# Patient Record
Sex: FEMALE | Race: AMERICAN INDIAN OR ALASKA NATIVE | Employment: FULL TIME | URBAN - METROPOLITAN AREA
[De-identification: names, ages, dates, MRNs, and addresses within clinical notes are randomized per-mention and may not be internally consistent; named-entity substitution may affect disease eponyms.]

---

## 2017-08-30 ENCOUNTER — HOSPITAL ENCOUNTER (OUTPATIENT)
Dept: MAMMOGRAPHY | Age: 47
Discharge: HOME | End: 2017-08-30
Attending: FAMILY MEDICINE
Payer: COMMERCIAL

## 2017-08-30 DIAGNOSIS — Z12.31 VISIT FOR SCREENING MAMMOGRAM: ICD-10-CM

## 2017-08-30 PROCEDURE — 77067 SCR MAMMO BI INCL CAD: CPT

## 2018-09-11 ENCOUNTER — HOSPITAL ENCOUNTER (OUTPATIENT)
Dept: MAMMOGRAPHY | Age: 48
Discharge: HOME | End: 2018-09-11
Attending: FAMILY MEDICINE
Payer: COMMERCIAL

## 2018-09-11 DIAGNOSIS — Z12.31 VISIT FOR SCREENING MAMMOGRAM: ICD-10-CM

## 2018-09-11 PROCEDURE — 77067 SCR MAMMO BI INCL CAD: CPT

## 2019-03-29 ENCOUNTER — HOSPITAL ENCOUNTER (INPATIENT)
Age: 49
LOS: 2 days | Discharge: HOME | DRG: 638 | End: 2019-03-31
Attending: EMERGENCY MEDICINE | Admitting: INTERNAL MEDICINE
Payer: COMMERCIAL

## 2019-03-29 DIAGNOSIS — E13.10 DIABETIC KETOACIDOSIS WITHOUT COMA ASSOCIATED WITH OTHER SPECIFIED DIABETES MELLITUS (HCC): Primary | ICD-10-CM

## 2019-03-29 PROBLEM — E11.10 DKA (DIABETIC KETOACIDOSES): Status: ACTIVE | Noted: 2019-03-29

## 2019-03-29 LAB
ADMINISTERED INITIALS, ADMINIT: NORMAL
ALBUMIN SERPL-MCNC: 4.8 G/DL (ref 3.4–5)
ALBUMIN/GLOB SERPL: 1 {RATIO} (ref 0.8–1.7)
ALP SERPL-CCNC: 156 U/L (ref 45–117)
ALT SERPL-CCNC: 32 U/L (ref 13–56)
ANION GAP SERPL CALC-SCNC: 14 MMOL/L (ref 3–18)
APPEARANCE UR: ABNORMAL
AST SERPL-CCNC: 9 U/L (ref 15–37)
BASOPHILS # BLD: 0 K/UL (ref 0–0.1)
BASOPHILS NFR BLD: 0 % (ref 0–2)
BILIRUB SERPL-MCNC: 0.4 MG/DL (ref 0.2–1)
BILIRUB UR QL: NEGATIVE
BUN SERPL-MCNC: 59 MG/DL (ref 7–18)
BUN/CREAT SERPL: 29 (ref 12–20)
CALCIUM SERPL-MCNC: 10.1 MG/DL (ref 8.5–10.1)
CHLORIDE SERPL-SCNC: 95 MMOL/L (ref 100–108)
CO2 SERPL-SCNC: 24 MMOL/L (ref 21–32)
COLOR UR: YELLOW
CREAT SERPL-MCNC: 2.03 MG/DL (ref 0.6–1.3)
D50 ADMINISTERED, D50ADM: 0 ML
D50 ORDER, D50ORD: 0 ML
DIFFERENTIAL METHOD BLD: ABNORMAL
EOSINOPHIL # BLD: 0 K/UL (ref 0–0.4)
EOSINOPHIL NFR BLD: 0 % (ref 0–5)
ERYTHROCYTE [DISTWIDTH] IN BLOOD BY AUTOMATED COUNT: 12.3 % (ref 11.6–14.5)
EST. AVERAGE GLUCOSE BLD GHB EST-MCNC: 260 MG/DL
GLOBULIN SER CALC-MCNC: 4.8 G/DL (ref 2–4)
GLUCOSE BLD STRIP.AUTO-MCNC: 162 MG/DL (ref 70–110)
GLUCOSE BLD STRIP.AUTO-MCNC: 175 MG/DL (ref 70–110)
GLUCOSE BLD STRIP.AUTO-MCNC: 217 MG/DL (ref 70–110)
GLUCOSE BLD STRIP.AUTO-MCNC: 355 MG/DL (ref 70–110)
GLUCOSE BLD STRIP.AUTO-MCNC: 485 MG/DL (ref 70–110)
GLUCOSE BLD STRIP.AUTO-MCNC: 572 MG/DL (ref 70–110)
GLUCOSE BLD STRIP.AUTO-MCNC: >600 MG/DL (ref 70–110)
GLUCOSE BLD STRIP.AUTO-MCNC: >600 MG/DL (ref 70–110)
GLUCOSE SERPL-MCNC: 980 MG/DL (ref 74–99)
GLUCOSE UR STRIP.AUTO-MCNC: >1000 MG/DL
GLUCOSE, GLC: 162 MG/DL
GLUCOSE, GLC: 175 MG/DL
GLUCOSE, GLC: 217 MG/DL
GLUCOSE, GLC: 355 MG/DL
GLUCOSE, GLC: 485 MG/DL
GLUCOSE, GLC: 572 MG/DL
GLUCOSE, GLC: 600 MG/DL
GLUCOSE, GLC: 601 MG/DL
HBA1C MFR BLD: 10.7 % (ref 4.2–5.6)
HCT VFR BLD AUTO: 47.8 % (ref 35–45)
HGB BLD-MCNC: 16.1 G/DL (ref 12–16)
HGB UR QL STRIP: NEGATIVE
HIGH TARGET, HITG: 180 MG/DL
INSULIN ADMINSTERED, INSADM: 10.8 UNITS/HOUR
INSULIN ADMINSTERED, INSADM: 14.8 UNITS/HOUR
INSULIN ADMINSTERED, INSADM: 16.2 UNITS/HOUR
INSULIN ADMINSTERED, INSADM: 20.5 UNITS/HOUR
INSULIN ADMINSTERED, INSADM: 21.3 UNITS/HOUR
INSULIN ADMINSTERED, INSADM: 5.1 UNITS/HOUR
INSULIN ADMINSTERED, INSADM: 5.8 UNITS/HOUR
INSULIN ADMINSTERED, INSADM: 7.9 UNITS/HOUR
INSULIN ORDER, INSORD: 10.8 UNITS/HOUR
INSULIN ORDER, INSORD: 14.8 UNITS/HOUR
INSULIN ORDER, INSORD: 16.2 UNITS/HOUR
INSULIN ORDER, INSORD: 20.5 UNITS/HOUR
INSULIN ORDER, INSORD: 21.3 UNITS/HOUR
INSULIN ORDER, INSORD: 5.1 UNITS/HOUR
INSULIN ORDER, INSORD: 5.8 UNITS/HOUR
INSULIN ORDER, INSORD: 7.9 UNITS/HOUR
KETONES UR QL STRIP.AUTO: 15 MG/DL
LEUKOCYTE ESTERASE UR QL STRIP.AUTO: NEGATIVE
LOW TARGET, LOT: 140 MG/DL
LYMPHOCYTES # BLD: 1.2 K/UL (ref 0.9–3.6)
LYMPHOCYTES NFR BLD: 8 % (ref 21–52)
MAGNESIUM SERPL-MCNC: 3.8 MG/DL (ref 1.6–2.6)
MAGNESIUM SERPL-MCNC: 4.2 MG/DL (ref 1.6–2.6)
MCH RBC QN AUTO: 27.7 PG (ref 24–34)
MCHC RBC AUTO-ENTMCNC: 33.7 G/DL (ref 31–37)
MCV RBC AUTO: 82.3 FL (ref 74–97)
MINUTES UNTIL NEXT BG, NBG: 60 MIN
MONOCYTES # BLD: 0.8 K/UL (ref 0.05–1.2)
MONOCYTES NFR BLD: 5 % (ref 3–10)
MULTIPLIER, MUL: 0.02
MULTIPLIER, MUL: 0.03
MULTIPLIER, MUL: 0.04
MULTIPLIER, MUL: 0.05
NEUTS SEG # BLD: 12.9 K/UL (ref 1.8–8)
NEUTS SEG NFR BLD: 87 % (ref 40–73)
NITRITE UR QL STRIP.AUTO: NEGATIVE
ORDER INITIALS, ORDINIT: NORMAL
PH UR STRIP: 5 [PH] (ref 5–8)
PHOSPHATE SERPL-MCNC: 8.7 MG/DL (ref 2.5–4.9)
PLATELET # BLD AUTO: 256 K/UL (ref 135–420)
PMV BLD AUTO: 12 FL (ref 9.2–11.8)
POTASSIUM SERPL-SCNC: 4.9 MMOL/L (ref 3.5–5.5)
PROT SERPL-MCNC: 9.6 G/DL (ref 6.4–8.2)
PROT UR STRIP-MCNC: NEGATIVE MG/DL
RBC # BLD AUTO: 5.81 M/UL (ref 4.2–5.3)
SODIUM SERPL-SCNC: 133 MMOL/L (ref 136–145)
SP GR UR REFRACTOMETRY: 1.03 (ref 1–1.03)
UROBILINOGEN UR QL STRIP.AUTO: 0.2 EU/DL (ref 0.2–1)
WBC # BLD AUTO: 15 K/UL (ref 4.6–13.2)

## 2019-03-29 PROCEDURE — 80053 COMPREHEN METABOLIC PANEL: CPT

## 2019-03-29 PROCEDURE — 85025 COMPLETE CBC W/AUTO DIFF WBC: CPT

## 2019-03-29 PROCEDURE — 74011250636 HC RX REV CODE- 250/636: Performed by: EMERGENCY MEDICINE

## 2019-03-29 PROCEDURE — 74011636637 HC RX REV CODE- 636/637: Performed by: EMERGENCY MEDICINE

## 2019-03-29 PROCEDURE — 65660000004 HC RM CVT STEPDOWN

## 2019-03-29 PROCEDURE — 36415 COLL VENOUS BLD VENIPUNCTURE: CPT

## 2019-03-29 PROCEDURE — 83036 HEMOGLOBIN GLYCOSYLATED A1C: CPT

## 2019-03-29 PROCEDURE — 99283 EMERGENCY DEPT VISIT LOW MDM: CPT

## 2019-03-29 PROCEDURE — 74011250637 HC RX REV CODE- 250/637: Performed by: INTERNAL MEDICINE

## 2019-03-29 PROCEDURE — 74011250636 HC RX REV CODE- 250/636: Performed by: INTERNAL MEDICINE

## 2019-03-29 PROCEDURE — 84100 ASSAY OF PHOSPHORUS: CPT

## 2019-03-29 PROCEDURE — 93005 ELECTROCARDIOGRAM TRACING: CPT

## 2019-03-29 PROCEDURE — 96360 HYDRATION IV INFUSION INIT: CPT

## 2019-03-29 PROCEDURE — 82962 GLUCOSE BLOOD TEST: CPT

## 2019-03-29 PROCEDURE — 81003 URINALYSIS AUTO W/O SCOPE: CPT

## 2019-03-29 PROCEDURE — 83735 ASSAY OF MAGNESIUM: CPT

## 2019-03-29 RX ORDER — SODIUM CHLORIDE 9 MG/ML
125 INJECTION, SOLUTION INTRAVENOUS CONTINUOUS
Status: DISCONTINUED | OUTPATIENT
Start: 2019-03-29 | End: 2019-03-30

## 2019-03-29 RX ORDER — DEXTROSE 50 % IN WATER (D50W) INTRAVENOUS SYRINGE
25-50 AS NEEDED
Status: DISCONTINUED | OUTPATIENT
Start: 2019-03-29 | End: 2019-03-30

## 2019-03-29 RX ORDER — SERTRALINE HYDROCHLORIDE 100 MG/1
100 TABLET, FILM COATED ORAL DAILY
COMMUNITY
End: 2019-05-02

## 2019-03-29 RX ORDER — LISINOPRIL AND HYDROCHLOROTHIAZIDE 10; 12.5 MG/1; MG/1
1 TABLET ORAL DAILY
COMMUNITY
End: 2019-05-02 | Stop reason: SDUPTHER

## 2019-03-29 RX ORDER — SERTRALINE HYDROCHLORIDE 50 MG/1
100 TABLET, FILM COATED ORAL DAILY
Status: DISCONTINUED | OUTPATIENT
Start: 2019-03-30 | End: 2019-03-31 | Stop reason: HOSPADM

## 2019-03-29 RX ORDER — SIMVASTATIN 40 MG/1
40 TABLET, FILM COATED ORAL
COMMUNITY
End: 2019-05-02

## 2019-03-29 RX ORDER — SIMVASTATIN 40 MG/1
40 TABLET, FILM COATED ORAL
Status: DISCONTINUED | OUTPATIENT
Start: 2019-03-29 | End: 2019-03-31 | Stop reason: HOSPADM

## 2019-03-29 RX ORDER — MAGNESIUM SULFATE 100 %
4 CRYSTALS MISCELLANEOUS AS NEEDED
Status: DISCONTINUED | OUTPATIENT
Start: 2019-03-29 | End: 2019-03-30

## 2019-03-29 RX ADMIN — SODIUM CHLORIDE 125 ML/HR: 900 INJECTION, SOLUTION INTRAVENOUS at 21:59

## 2019-03-29 RX ADMIN — SODIUM CHLORIDE 1000 ML: 900 INJECTION, SOLUTION INTRAVENOUS at 13:11

## 2019-03-29 RX ADMIN — SODIUM CHLORIDE 1000 ML: 900 INJECTION, SOLUTION INTRAVENOUS at 12:11

## 2019-03-29 RX ADMIN — Medication 10.8 UNITS/HR: at 13:46

## 2019-03-29 RX ADMIN — SODIUM CHLORIDE 1000 ML: 900 INJECTION, SOLUTION INTRAVENOUS at 12:07

## 2019-03-29 RX ADMIN — SIMVASTATIN 40 MG: 40 TABLET, FILM COATED ORAL at 21:59

## 2019-03-29 NOTE — ROUTINE PROCESS
TRANSFER - OUT REPORT: 
 
Verbal report given to [facility] RN(name) on Markus Owen  being transferred to [facility](unit) for routine progression of care Report consisted of patients Situation, Background, Assessment and  
Recommendations(SBAR). Information from the following report(s) SBAR was reviewed with the receiving nurse. Lines:  
Peripheral IV 03/29/19 Left Antecubital (Active) Opportunity for questions and clarification was provided. Patient transported with: 
 Monitor Registered Nurse Tech

## 2019-03-29 NOTE — ED PROVIDER NOTES
EMERGENCY DEPARTMENT HISTORY AND PHYSICAL EXAM 
 
11:54 AM 
Date: 3/29/2019 Patient Name: Federico Jacobs History of Presenting Illness Chief Complaint Patient presents with  Abnormal Lab Results  Dizziness History Provided By: Patient HPI: Federico Jacobs is a 51 y.o. female with hypertension, hyperlipidemia, here for here for concern for diabetes. So since Monday she has had a dry mouth, thirst, and increased urination. [facility] discovered a glucose over 700, borderline acidosis, urinalysis with 500 glucose with ketones, AK I. Potassium is 4.7 there. She has tried to drink but it hasn't helped her symptoms. No other concerns. PCP: Agustín Rankin MD 
No current facility-administered medications on file prior to encounter. Current Outpatient Medications on File Prior to Encounter Medication Sig Dispense Refill  B-complex with vitamin C (VITAMIN B COMPLEX-C PO) Take  by mouth.  sertraline (ZOLOFT) 100 mg tablet Take 100 mg by mouth daily.  lisinopril-hydroCHLOROthiazide (PRINZIDE, ZESTORETIC) 10-12.5 mg per tablet Take 1 Tab by mouth daily.  simvastatin (ZOCOR) 40 mg tablet Take 40 mg by mouth nightly. Past History Past Medical History: 
Past Medical History:  
Diagnosis Date  Depression  High cholesterol  Hypertension Past Surgical History: 
History reviewed. No pertinent surgical history. Family History: 
History reviewed. No pertinent family history. Social History: 
Social History Tobacco Use  Smoking status: Not on file Substance Use Topics  Alcohol use: Not on file  Drug use: Not on file Allergies: 
No Known Allergies Review of Systems Constitutional: No fevers. Eyes: No visual symptoms. ENT: No sore throat, runny nose, or ear pain. Respiratory: No cough, dyspnea, or wheezing. Cardiovascular: No chest pain, palpitations, or heaviness. Gastrointestinal: No nausea, vomiting, diarrhea. Genitourinary: No dysuria or urgency but she does have frequency. Musculoskeletal: No joint pain or swelling. Integumentary: No rashes. Neurological: No headaches, sensory or motor symptoms. All other systems negative. Physical Exam  
 
Patient Vitals for the past 12 hrs: 
 Temp Pulse Resp BP SpO2  
03/29/19 1032 98.4 °F (36.9 °C) (!) 105 18 141/80 96 % Physical Exam  
Constitutional: Appears well-developed. Is not diaphoretic. Eyes: Conjunctiva clear, EOMI Head: Normocephalic and atraumatic. Dry mucous membranes. Neck: Normal range of motion. No stridor or tracheal deviation. Cardiovascular: Intact distal pulses. Tachycardia. Pulmonary/Chest: Effort normal and no respiratory distress. Abdominal: Non distended. Musculoskeletal: Normal range of motion. Exhibits no tenderness. Neurological: Conversant, alert. Skin: Skin is warm and dry. Psychiatric: Has a normal mood and affect. Behavior is normal.  
Nursing note and vitals reviewed. Diagnostic Study Results Labs - Recent Results (from the past 12 hour(s)) EKG, 12 LEAD, INITIAL Collection Time: 03/29/19 10:46 AM  
Result Value Ref Range Ventricular Rate 108 BPM  
 Atrial Rate 108 BPM  
 P-R Interval 134 ms QRS Duration 96 ms  
 Q-T Interval 356 ms  
 QTC Calculation (Bezet) 477 ms Calculated P Axis 50 degrees Calculated R Axis -22 degrees Calculated T Axis 40 degrees Diagnosis Sinus tachycardia Biatrial enlargement Abnormal ECG No previous ECGs available CBC WITH AUTOMATED DIFF Collection Time: 03/29/19 11:44 AM  
Result Value Ref Range WBC 15.0 (H) 4.6 - 13.2 K/uL  
 RBC 5.81 (H) 4.20 - 5.30 M/uL  
 HGB 16.1 (H) 12.0 - 16.0 g/dL HCT 47.8 (H) 35.0 - 45.0 % MCV 82.3 74.0 - 97.0 FL  
 MCH 27.7 24.0 - 34.0 PG  
 MCHC 33.7 31.0 - 37.0 g/dL  
 RDW 12.3 11.6 - 14.5 % PLATELET 694 364 - 614 K/uL  MPV 12.0 (H) 9.2 - 11.8 FL  
 NEUTROPHILS 87 (H) 40 - 73 % LYMPHOCYTES 8 (L) 21 - 52 % MONOCYTES 5 3 - 10 % EOSINOPHILS 0 0 - 5 % BASOPHILS 0 0 - 2 %  
 ABS. NEUTROPHILS 12.9 (H) 1.8 - 8.0 K/UL  
 ABS. LYMPHOCYTES 1.2 0.9 - 3.6 K/UL  
 ABS. MONOCYTES 0.8 0.05 - 1.2 K/UL  
 ABS. EOSINOPHILS 0.0 0.0 - 0.4 K/UL  
 ABS. BASOPHILS 0.0 0.0 - 0.1 K/UL  
 DF AUTOMATED METABOLIC PANEL, COMPREHENSIVE Collection Time: 03/29/19 11:44 AM  
Result Value Ref Range Sodium 133 (L) 136 - 145 mmol/L Potassium 4.9 3.5 - 5.5 mmol/L Chloride 95 (L) 100 - 108 mmol/L  
 CO2 24 21 - 32 mmol/L Anion gap 14 3.0 - 18 mmol/L Glucose 980 (HH) 74 - 99 mg/dL BUN 59 (H) 7.0 - 18 MG/DL Creatinine 2.03 (H) 0.6 - 1.3 MG/DL  
 BUN/Creatinine ratio 29 (H) 12 - 20 GFR est AA 32 (L) >60 ml/min/1.73m2 GFR est non-AA 26 (L) >60 ml/min/1.73m2 Calcium 10.1 8.5 - 10.1 MG/DL Bilirubin, total 0.4 0.2 - 1.0 MG/DL  
 ALT (SGPT) 32 13 - 56 U/L  
 AST (SGOT) 9 (L) 15 - 37 U/L Alk. phosphatase 156 (H) 45 - 117 U/L Protein, total 9.6 (H) 6.4 - 8.2 g/dL Albumin 4.8 3.4 - 5.0 g/dL Globulin 4.8 (H) 2.0 - 4.0 g/dL A-G Ratio 1.0 0.8 - 1.7 MAGNESIUM Collection Time: 03/29/19 11:44 AM  
Result Value Ref Range Magnesium 4.2 (H) 1.6 - 2.6 mg/dL PHOSPHORUS Collection Time: 03/29/19 11:44 AM  
Result Value Ref Range Phosphorus 8.7 (H) 2.5 - 4.9 MG/DL  
HEMOGLOBIN A1C WITH EAG Collection Time: 03/29/19 11:44 AM  
Result Value Ref Range Hemoglobin A1c 10.7 (H) 4.2 - 5.6 % Est. average glucose 260 mg/dL URINALYSIS W/ RFLX MICROSCOPIC Collection Time: 03/29/19 12:29 PM  
Result Value Ref Range Color YELLOW Appearance CLOUDY Specific gravity 1.026 1.005 - 1.030    
 pH (UA) 5.0 5.0 - 8.0 Protein NEGATIVE  NEG mg/dL Glucose >1,000 (A) NEG mg/dL Ketone 15 (A) NEG mg/dL Bilirubin NEGATIVE  NEG Blood NEGATIVE  NEG Urobilinogen 0.2 0.2 - 1.0 EU/dL  Nitrites NEGATIVE  NEG    
 Leukocyte Esterase NEGATIVE  NEG Radiologic Studies - No orders to display CT Results  (Last 48 hours) None CXR Results  (Last 48 hours) None Medical Decision Making ED Course: Progress Notes, Reevaluation, and Consults: 
 
 
Provider Notes (Medical Decision Making): This patient came to the ED with significant hyperglycemia. Based on history, physical exam, and diagnostic evaluation, the patient appears to be in mild diabetic ketoacidosis. There is no evidence of infection precipitating these events and this is a new diagnosis. They were treated in the ED with agressive fluids and insulin in the ED with glucostabilizer. Despite treatment, the severity of the patient's condition requires further management as an inpatient. I have discussed the case with the admitting physician who accepts the patient. Critical Care Time: Critical Care Time: The services I provided to this patient were to treat and/or prevent clinically significant deterioration that could result in the failure of one or more body systems and/or organ systems due to DKA. Services included the following: 
-reviewing nursing notes and old charts 
-vital sign assessments 
-direct patient care 
-medication orders and management 
-interpreting and reviewing diagnostic studies/labs 
-re-evaluations 
-documentation time Aggregate critical care time was 35 minutes, which includes only time during which I was engaged in work directly related to the patient's care as described above, whether I was at bedside or elsewhere in the Emergency Department. It did not include time spent performing other reported procedures or the services of residents, students, nurses, or advance practice providers. Sheryle Gary, MD 
 
1:37 PM 
 
 
Current Outpatient Medications Medication Sig Dispense Refill  B-complex with vitamin C (VITAMIN B COMPLEX-C PO) Take  by mouth.  sertraline (ZOLOFT) 100 mg tablet Take 100 mg by mouth daily.  lisinopril-hydroCHLOROthiazide (PRINZIDE, ZESTORETIC) 10-12.5 mg per tablet Take 1 Tab by mouth daily.  simvastatin (ZOCOR) 40 mg tablet Take 40 mg by mouth nightly. Vital Signs-Reviewed the patient's vital signs. EKG: Interpreted by the EP. Time Interpreted: 7712 Rate: 108 Rhythm: Sinus tachycardia Interpretation: , nonspecific ST's. Records Reviewed: Nursing Notes and Old Medical Records (Time of Review: 11:54 AM) 
-I am the first provider for this patient. 
-I reviewed the vital signs, available nursing notes, past medical history, past surgical history, family history and social history. Diagnosis Clinical Impression: 1. Diabetic ketoacidosis without coma associated with other specified diabetes mellitus ([facility] [address] Disposition: Admit to stepdown

## 2019-03-29 NOTE — ED TRIAGE NOTES
\"she went to [facility] and we found out that she has diabetes so they told us to come here\". Pt. Also c/o lightheadedness x 3 days.

## 2019-03-29 NOTE — DIABETES MGMT
GLYCEMIC CONTROL AND NUTRITION Assessment/Recommendations: 
Visited pt in the emergency room.  at bedside. New diagnosis of diabetes. Admitted with BG of 980 mg/dl. GlucoStabilizer insulin gtt started. Will continue inpatient monitoring and education. Contact information given to pt for future questions/concerns. Most recent blood glucose values: 
Results for Rodrick Bills (MRN [id number]) as of 3/29/2019 14:18 Ref. Range 3/29/2019 11:44 Glucose Latest Ref Range: 74 - 99 mg/dL 980 () Current A1C of 10.7 % is equivalent to average blood glucose of 260 mg/dl over the past 2-3 months. Current hospital diabetes medications:  
GlucoStabilizer insulin gtt Home diabetes medications: 
None. New diagnosis Diet:   
Currently NPO Education:  __x_Refer to Diabetes Education Record  
          ____Education not indicated at this time Inga Pearson RN CDE Ext [phone]

## 2019-03-29 NOTE — H&P
History and Physical 
NAME:  Altaf Driver :   1970 MRN:   [id number] Date/Time:  3/29/2019 Chief Complaint: elevated blood sugar History of Present Illness: Ms. Irma Darnell is a 51 y.o.   female with a PMH of HTN, hyperlipidemia and depression who presents with c/c of elevated blood sugar. She was having polydipsia, polyuria since Monday. She went to [facility] today and found to have elevated blood sugar >700 and send to ED. Here in ED her blood sugar was 980. CO2 is 24. She looks dry. She is started on IV insulin drip and IVF per protocol. She has no fever or chills. No cough or shortness of breathing. No urinary complaint. Past Medical History:  
Diagnosis Date  Depression  High cholesterol  Hypertension History reviewed. No pertinent surgical history. Social History Tobacco Use  Smoking status: Not on file Substance Use Topics  Alcohol use: Not on file History reviewed. No pertinent family history. No Known Allergies Prior to Admission medications Medication Sig Start Date End Date Taking? Authorizing Provider B-complex with vitamin C (VITAMIN B COMPLEX-C PO) Take  by mouth. Yes Other, MD Martine  
sertraline (ZOLOFT) 100 mg tablet Take 100 mg by mouth daily. Yes Other, MD Martine  
lisinopril-hydroCHLOROthiazide (PRINZIDE, ZESTORETIC) 10-12.5 mg per tablet Take 1 Tab by mouth daily. Yes Other, MD Martine  
simvastatin (ZOCOR) 40 mg tablet Take 40 mg by mouth nightly. Yes Other, MD Martine  
 
 
  
Review of systems:  
 
CONSTITUTIONAL: No Fever, No chills, No weight loss, No Night sweats HEENT:  No epistaxis, No diff in swallowing CVS: No chest pain, No palpitations, No syncope, No peripheral edema, No PND, No orthopnea RS: No shortness of breath, No cough, No hemoptysis, No pleuritic chest pain GI: No abd pain, No vomitting, No diarrhea, No hematemesis, No rectal bleeding, No acid reflux or heartburn NEURO: No focal weakness, No headaches, No seizures PSYCH: No anxiety, No depression MUSCULOSKLETAL: No joint pain or swelling : No hematuria or dysuria SKIN: No rash Physical Exam: VITALS:   
Vital signs reviewed; most recent are: 
 
Visit Vitals /80 (BP 1 Location: Left arm, BP Patient Position: Sitting) Pulse (!) 105 Temp 98.4 °F (36.9 °C) Resp 18 Ht 5' 4\" (1.626 m) Wt 95.7 kg (211 lb 1 oz) SpO2 96% BMI 36.23 kg/m² SpO2 Readings from Last 6 Encounters:  
03/29/19 96% No intake or output data in the 24 hours ending 03/29/19 1416 GENERAL: Not in acute distress HEENT: pink conjunctiva, un icteric sclera, NECK: No lymphadenopthy or thyroid swelling, JVD not seen LYMPH: No supraclavicular or cervical or axillary nodes on both sides CVS: S1S2, No murmurs, No gallop or rub RS: CTA, No wheezing or crackles Abd: Soft, non tender, not distended, No guarding, No rigidity NEURO:  No focal neurologic deficits Extrm: no leg edema or swelling Skin: No rash Labs:  
 
Recent Results (from the past 24 hour(s)) EKG, 12 LEAD, INITIAL Collection Time: 03/29/19 10:46 AM  
Result Value Ref Range Ventricular Rate 108 BPM  
 Atrial Rate 108 BPM  
 P-R Interval 134 ms QRS Duration 96 ms  
 Q-T Interval 356 ms  
 QTC Calculation (Bezet) 477 ms Calculated P Axis 50 degrees Calculated R Axis -22 degrees Calculated T Axis 40 degrees Diagnosis Sinus tachycardia Biatrial enlargement Abnormal ECG No previous ECGs available CBC WITH AUTOMATED DIFF Collection Time: 03/29/19 11:44 AM  
Result Value Ref Range WBC 15.0 (H) 4.6 - 13.2 K/uL  
 RBC 5.81 (H) 4.20 - 5.30 M/uL  
 HGB 16.1 (H) 12.0 - 16.0 g/dL HCT 47.8 (H) 35.0 - 45.0 % MCV 82.3 74.0 - 97.0 FL  
 MCH 27.7 24.0 - 34.0 PG  
 MCHC 33.7 31.0 - 37.0 g/dL  
 RDW 12.3 11.6 - 14.5 % PLATELET 819 652 - 039 K/uL  MPV 12.0 (H) 9.2 - 11.8 FL  
 NEUTROPHILS 87 (H) 40 - 73 % LYMPHOCYTES 8 (L) 21 - 52 % MONOCYTES 5 3 - 10 % EOSINOPHILS 0 0 - 5 % BASOPHILS 0 0 - 2 %  
 ABS. NEUTROPHILS 12.9 (H) 1.8 - 8.0 K/UL  
 ABS. LYMPHOCYTES 1.2 0.9 - 3.6 K/UL  
 ABS. MONOCYTES 0.8 0.05 - 1.2 K/UL  
 ABS. EOSINOPHILS 0.0 0.0 - 0.4 K/UL  
 ABS. BASOPHILS 0.0 0.0 - 0.1 K/UL  
 DF AUTOMATED METABOLIC PANEL, COMPREHENSIVE Collection Time: 03/29/19 11:44 AM  
Result Value Ref Range Sodium 133 (L) 136 - 145 mmol/L Potassium 4.9 3.5 - 5.5 mmol/L Chloride 95 (L) 100 - 108 mmol/L  
 CO2 24 21 - 32 mmol/L Anion gap 14 3.0 - 18 mmol/L Glucose 980 (HH) 74 - 99 mg/dL BUN 59 (H) 7.0 - 18 MG/DL Creatinine 2.03 (H) 0.6 - 1.3 MG/DL  
 BUN/Creatinine ratio 29 (H) 12 - 20 GFR est AA 32 (L) >60 ml/min/1.73m2 GFR est non-AA 26 (L) >60 ml/min/1.73m2 Calcium 10.1 8.5 - 10.1 MG/DL Bilirubin, total 0.4 0.2 - 1.0 MG/DL  
 ALT (SGPT) 32 13 - 56 U/L  
 AST (SGOT) 9 (L) 15 - 37 U/L Alk. phosphatase 156 (H) 45 - 117 U/L Protein, total 9.6 (H) 6.4 - 8.2 g/dL Albumin 4.8 3.4 - 5.0 g/dL Globulin 4.8 (H) 2.0 - 4.0 g/dL A-G Ratio 1.0 0.8 - 1.7 MAGNESIUM Collection Time: 03/29/19 11:44 AM  
Result Value Ref Range Magnesium 4.2 (H) 1.6 - 2.6 mg/dL PHOSPHORUS Collection Time: 03/29/19 11:44 AM  
Result Value Ref Range Phosphorus 8.7 (H) 2.5 - 4.9 MG/DL  
HEMOGLOBIN A1C WITH EAG Collection Time: 03/29/19 11:44 AM  
Result Value Ref Range Hemoglobin A1c 10.7 (H) 4.2 - 5.6 % Est. average glucose 260 mg/dL URINALYSIS W/ RFLX MICROSCOPIC Collection Time: 03/29/19 12:29 PM  
Result Value Ref Range Color YELLOW Appearance CLOUDY Specific gravity 1.026 1.005 - 1.030    
 pH (UA) 5.0 5.0 - 8.0 Protein NEGATIVE  NEG mg/dL Glucose >1,000 (A) NEG mg/dL Ketone 15 (A) NEG mg/dL Bilirubin NEGATIVE  NEG Blood NEGATIVE  NEG Urobilinogen 0.2 0.2 - 1.0 EU/dL  Nitrites NEGATIVE  NEG    
 Leukocyte Esterase NEGATIVE  NEG    
GLUCOSE, POC Collection Time: 03/29/19  1:27 PM  
Result Value Ref Range Glucose (POC) >600 (HH) 70 - 110 mg/dL Pate Cast Collection Time: 03/29/19  1:45 PM  
Result Value Ref Range Glucose 600 mg/dL Insulin order 10.8 units/hour Insulin adminstered 10.8 units/hour Multiplier 0.020 Low target 140 mg/dL High target 180 mg/dL D50 order 0.0 ml  
 D50 administered 0.00 ml Minutes until next BG 60 min Order initials la Administered initials la Active Problems: 
  DKA (diabetic ketoacidoses) ([facility] [address] (3/29/2019) Assessment: 1. Hyperglycemia 2. DM-2, new onset, uncontrolled 3. TIA, prerenal 
4. HTN,  
5. Hyperlipidemia 6. Depression Plan:  
 
 
· Patient is being admitted. · Started on IV insulin gtt · Continue IVF hydration · Diabetic teaching · Monitor renal function · Resume home meds, hold off Losartan/HCTZ until renal function improve · D/we patient and family at bedside · Full code Total time:  57 minutes 
     
_______________________________________________________________________ Attending Physician:  Bogdan Stephens MD  
 
 
Copies: Florentino Terry MD

## 2019-03-29 NOTE — DIABETES MGMT
Diabetes Patient/Family Education RecordFactors That  May Influence Patients Ability  to Learn or  Comply with Recommendations []   Language barrier    []   Cultural needs   []   Motivation  
 []   Cognitive limitation    []   Physical   []   Education  
 []   Physiological factors   []   Hearing/vision/speaking impairment   []   Anabaptist beliefs []   Financial factors   []  Other:   [x]  No factors identified at this time. Person Instructed: [x]   Patient   [x]   Family ()   []  Other Preference for Learning: 
 [x]   Verbal   [x]   Written   [x]  Demonstration Level of Comprehension & Competence:   
[x]  Good                                      [] Fair                                     []  Poor                             [x]  Needs Reinforcement r/t new diagnosis [x]  Teachback completed Education Component:  
[x]  Medication management, including how to administer insulin (if appropriate) and potential medication interactions new diagnosis of diabetes. Family hx (father). Reviewed diabetes basics with pt. Written information also provided. [x]  Nutritional management -obtain usual meal pattern basic carbohydrate information reviewed with patient. Encouraged patient to avoid sugary beverages and to limit concentrated sweets. [x]  Exercise [x]  Signs, symptoms, and treatment of hyperglycemia and hypoglycemia [x] Prevention, recognition and treatment of hyperglycemia and hypoglycemia [x]  Importance of blood glucose monitoring and how to obtain a blood glucose meter reviewed target blood sugars with pt. [x]  Instruction on use of the blood glucose meter Contour meter given to patient and instructed her and her  on how to use. A BG logbook, and an extra 100 lancets given to patient. Pt will need a prescription for Contour glucometer supplies at discharge.   
[x]  Discuss the importance of HbA1C monitoring 10.7% equivalent to an average blood glucose of 260 mg/dl over the past 2-3 months  
[x]  Sick day guidelines  
[x]  Proper use and disposal of lancets, needles, syringes or insulin pens (if appropriate) [x]  Potential long-term complications (retinopathy, kidney disease, neuropathy, foot care) [x] Information about whom to contact in case of emergency or for more information   
[x]  Goal:  Patient/family will demonstrate understanding of Diabetes Self Management Skills by: (date) 4/03/19 Plan for post-discharge education or self-management support: 
  [x] Outpatient class schedule provided            [] Patient Declined 
  [] Scheduled for outpatient classes (date) _______ Verify: 
Does patient understand how diabetes medications work? yes________________________ Does patient know what their most recent A1c is? yes______________________________ Does patient monitor glucose at home? New diagnosis_____________________________________ Does patient have difficulty obtaining diabetes medications or testing supplies? _________________ Torres Archer RN CDE Ext [phone]

## 2019-03-30 ENCOUNTER — APPOINTMENT (OUTPATIENT)
Dept: GENERAL RADIOLOGY | Age: 49
DRG: 638 | End: 2019-03-30
Attending: INTERNAL MEDICINE
Payer: COMMERCIAL

## 2019-03-30 PROBLEM — E11.10 DKA (DIABETIC KETOACIDOSES): Status: RESOLVED | Noted: 2019-03-29 | Resolved: 2019-03-30

## 2019-03-30 LAB
ADMINISTERED INITIALS, ADMINIT: NORMAL
ANION GAP SERPL CALC-SCNC: 6 MMOL/L (ref 3–18)
ANION GAP SERPL CALC-SCNC: 9 MMOL/L (ref 3–18)
BASOPHILS # BLD: 0 K/UL (ref 0–0.06)
BASOPHILS NFR BLD: 0 % (ref 0–3)
BUN SERPL-MCNC: 34 MG/DL (ref 7–18)
BUN SERPL-MCNC: 37 MG/DL (ref 7–18)
BUN SERPL-MCNC: 45 MG/DL (ref 7–18)
BUN SERPL-MCNC: 52 MG/DL (ref 7–18)
BUN/CREAT SERPL: 31 (ref 12–20)
BUN/CREAT SERPL: 33 (ref 12–20)
BUN/CREAT SERPL: 35 (ref 12–20)
BUN/CREAT SERPL: 39 (ref 12–20)
CALCIUM SERPL-MCNC: 10.2 MG/DL (ref 8.5–10.1)
CALCIUM SERPL-MCNC: 9.4 MG/DL (ref 8.5–10.1)
CALCIUM SERPL-MCNC: 9.6 MG/DL (ref 8.5–10.1)
CALCIUM SERPL-MCNC: 9.8 MG/DL (ref 8.5–10.1)
CHLORIDE SERPL-SCNC: 116 MMOL/L (ref 100–108)
CHLORIDE SERPL-SCNC: 116 MMOL/L (ref 100–108)
CHLORIDE SERPL-SCNC: 117 MMOL/L (ref 100–108)
CHLORIDE SERPL-SCNC: 118 MMOL/L (ref 100–108)
CO2 SERPL-SCNC: 20 MMOL/L (ref 21–32)
CO2 SERPL-SCNC: 27 MMOL/L (ref 21–32)
CO2 SERPL-SCNC: 32 MMOL/L (ref 21–32)
CO2 SERPL-SCNC: 32 MMOL/L (ref 21–32)
CREAT SERPL-MCNC: 1.09 MG/DL (ref 0.6–1.3)
CREAT SERPL-MCNC: 1.13 MG/DL (ref 0.6–1.3)
CREAT SERPL-MCNC: 1.3 MG/DL (ref 0.6–1.3)
CREAT SERPL-MCNC: 1.32 MG/DL (ref 0.6–1.3)
D50 ADMINISTERED, D50ADM: 0 ML
D50 ORDER, D50ORD: 0 ML
DIFFERENTIAL METHOD BLD: ABNORMAL
EOSINOPHIL # BLD: 0 K/UL (ref 0–0.4)
EOSINOPHIL NFR BLD: 0 % (ref 0–5)
ERYTHROCYTE [DISTWIDTH] IN BLOOD BY AUTOMATED COUNT: 12.5 % (ref 11.6–14.5)
GLUCOSE BLD STRIP.AUTO-MCNC: 107 MG/DL (ref 70–110)
GLUCOSE BLD STRIP.AUTO-MCNC: 114 MG/DL (ref 70–110)
GLUCOSE BLD STRIP.AUTO-MCNC: 116 MG/DL (ref 70–110)
GLUCOSE BLD STRIP.AUTO-MCNC: 124 MG/DL (ref 70–110)
GLUCOSE BLD STRIP.AUTO-MCNC: 142 MG/DL (ref 70–110)
GLUCOSE BLD STRIP.AUTO-MCNC: 174 MG/DL (ref 70–110)
GLUCOSE BLD STRIP.AUTO-MCNC: 174 MG/DL (ref 70–110)
GLUCOSE BLD STRIP.AUTO-MCNC: 185 MG/DL (ref 70–110)
GLUCOSE BLD STRIP.AUTO-MCNC: 191 MG/DL (ref 70–110)
GLUCOSE BLD STRIP.AUTO-MCNC: 194 MG/DL (ref 70–110)
GLUCOSE BLD STRIP.AUTO-MCNC: 195 MG/DL (ref 70–110)
GLUCOSE BLD STRIP.AUTO-MCNC: 210 MG/DL (ref 70–110)
GLUCOSE BLD STRIP.AUTO-MCNC: 241 MG/DL (ref 70–110)
GLUCOSE BLD STRIP.AUTO-MCNC: 247 MG/DL (ref 70–110)
GLUCOSE BLD STRIP.AUTO-MCNC: 247 MG/DL (ref 70–110)
GLUCOSE BLD STRIP.AUTO-MCNC: 71 MG/DL (ref 70–110)
GLUCOSE BLD STRIP.AUTO-MCNC: 97 MG/DL (ref 70–110)
GLUCOSE SERPL-MCNC: 129 MG/DL (ref 74–99)
GLUCOSE SERPL-MCNC: 141 MG/DL (ref 74–99)
GLUCOSE SERPL-MCNC: 143 MG/DL (ref 74–99)
GLUCOSE SERPL-MCNC: 234 MG/DL (ref 74–99)
GLUCOSE, GLC: 116 MG/DL
GLUCOSE, GLC: 124 MG/DL
GLUCOSE, GLC: 142 MG/DL
GLUCOSE, GLC: 174 MG/DL
GLUCOSE, GLC: 185 MG/DL
GLUCOSE, GLC: 191 MG/DL
GLUCOSE, GLC: 194 MG/DL
GLUCOSE, GLC: 210 MG/DL
GLUCOSE, GLC: 241 MG/DL
GLUCOSE, GLC: 247 MG/DL
GLUCOSE, GLC: 247 MG/DL
GLUCOSE, GLC: 71 MG/DL
GLUCOSE, GLC: 97 MG/DL
HCT VFR BLD AUTO: 46.6 % (ref 35–45)
HGB BLD-MCNC: 15.6 G/DL (ref 12–16)
HIGH TARGET, HITG: 180 MG/DL
INSULIN ADMINSTERED, INSADM: 0.8 UNITS/HOUR
INSULIN ADMINSTERED, INSADM: 13.7 UNITS/HOUR
INSULIN ADMINSTERED, INSADM: 14.5 UNITS/HOUR
INSULIN ADMINSTERED, INSADM: 16.1 UNITS/HOUR
INSULIN ADMINSTERED, INSADM: 16.5 UNITS/HOUR
INSULIN ADMINSTERED, INSADM: 16.8 UNITS/HOUR
INSULIN ADMINSTERED, INSADM: 18.7 UNITS/HOUR
INSULIN ADMINSTERED, INSADM: 2.3 UNITS/HOUR
INSULIN ADMINSTERED, INSADM: 2.8 UNITS/HOUR
INSULIN ADMINSTERED, INSADM: 6.1 UNITS/HOUR
INSULIN ADMINSTERED, INSADM: 7.5 UNITS/HOUR
INSULIN ADMINSTERED, INSADM: 9.2 UNITS/HOUR
INSULIN ADMINSTERED, INSADM: 9.8 UNITS/HOUR
INSULIN ORDER, INSORD: 0.8 UNITS/HOUR
INSULIN ORDER, INSORD: 13.7 UNITS/HOUR
INSULIN ORDER, INSORD: 14.5 UNITS/HOUR
INSULIN ORDER, INSORD: 16.1 UNITS/HOUR
INSULIN ORDER, INSORD: 16.5 UNITS/HOUR
INSULIN ORDER, INSORD: 16.8 UNITS/HOUR
INSULIN ORDER, INSORD: 18.7 UNITS/HOUR
INSULIN ORDER, INSORD: 2.3 UNITS/HOUR
INSULIN ORDER, INSORD: 2.8 UNITS/HOUR
INSULIN ORDER, INSORD: 6.1 UNITS/HOUR
INSULIN ORDER, INSORD: 7.5 UNITS/HOUR
INSULIN ORDER, INSORD: 9.2 UNITS/HOUR
INSULIN ORDER, INSORD: 9.8 UNITS/HOUR
LOW TARGET, LOT: 140 MG/DL
LYMPHOCYTES # BLD: 1.8 K/UL (ref 0.8–3.5)
LYMPHOCYTES NFR BLD: 12 % (ref 20–51)
MAGNESIUM SERPL-MCNC: 3.1 MG/DL (ref 1.6–2.6)
MAGNESIUM SERPL-MCNC: 3.3 MG/DL (ref 1.6–2.6)
MAGNESIUM SERPL-MCNC: 3.7 MG/DL (ref 1.6–2.6)
MAGNESIUM SERPL-MCNC: 3.7 MG/DL (ref 1.6–2.6)
MCH RBC QN AUTO: 27.4 PG (ref 24–34)
MCHC RBC AUTO-ENTMCNC: 33.5 G/DL (ref 31–37)
MCV RBC AUTO: 81.9 FL (ref 74–97)
MINUTES UNTIL NEXT BG, NBG: 60 MIN
MONOCYTES # BLD: 0.3 K/UL (ref 0–1)
MONOCYTES NFR BLD: 2 % (ref 2–9)
MULTIPLIER, MUL: 0.05
MULTIPLIER, MUL: 0.06
MULTIPLIER, MUL: 0.06
MULTIPLIER, MUL: 0.07
MULTIPLIER, MUL: 0.08
MULTIPLIER, MUL: 0.08
MULTIPLIER, MUL: 0.09
MULTIPLIER, MUL: 0.1
MULTIPLIER, MUL: 0.1
MULTIPLIER, MUL: 0.11
MULTIPLIER, MUL: 0.12
NEUTS BAND NFR BLD MANUAL: 5 % (ref 0–5)
NEUTS SEG # BLD: 12.9 K/UL (ref 1.8–8)
NEUTS SEG NFR BLD: 81 % (ref 42–75)
ORDER INITIALS, ORDINIT: NORMAL
PHOSPHATE SERPL-MCNC: 1.6 MG/DL (ref 2.5–4.9)
PLATELET # BLD AUTO: 185 K/UL (ref 135–420)
PLATELET COMMENTS,PCOM: ABNORMAL
PMV BLD AUTO: 12.1 FL (ref 9.2–11.8)
POTASSIUM SERPL-SCNC: 4 MMOL/L (ref 3.5–5.5)
POTASSIUM SERPL-SCNC: 4.3 MMOL/L (ref 3.5–5.5)
POTASSIUM SERPL-SCNC: 4.5 MMOL/L (ref 3.5–5.5)
POTASSIUM SERPL-SCNC: 4.8 MMOL/L (ref 3.5–5.5)
RBC # BLD AUTO: 5.69 M/UL (ref 4.2–5.3)
RBC MORPH BLD: ABNORMAL
SODIUM SERPL-SCNC: 147 MMOL/L (ref 136–145)
SODIUM SERPL-SCNC: 150 MMOL/L (ref 136–145)
SODIUM SERPL-SCNC: 154 MMOL/L (ref 136–145)
SODIUM SERPL-SCNC: 154 MMOL/L (ref 136–145)
T4 FREE SERPL-MCNC: 0.9 NG/DL (ref 0.7–1.5)
TSH SERPL DL<=0.05 MIU/L-ACNC: 0.83 UIU/ML (ref 0.36–3.74)
WBC # BLD AUTO: 15 K/UL (ref 4.6–13.2)

## 2019-03-30 PROCEDURE — 74011250636 HC RX REV CODE- 250/636: Performed by: INTERNAL MEDICINE

## 2019-03-30 PROCEDURE — 80048 BASIC METABOLIC PNL TOTAL CA: CPT

## 2019-03-30 PROCEDURE — 84443 ASSAY THYROID STIM HORMONE: CPT

## 2019-03-30 PROCEDURE — 65660000004 HC RM CVT STEPDOWN

## 2019-03-30 PROCEDURE — 74011000258 HC RX REV CODE- 258: Performed by: NURSE PRACTITIONER

## 2019-03-30 PROCEDURE — 83970 ASSAY OF PARATHORMONE: CPT

## 2019-03-30 PROCEDURE — 83735 ASSAY OF MAGNESIUM: CPT

## 2019-03-30 PROCEDURE — 74011250637 HC RX REV CODE- 250/637: Performed by: INTERNAL MEDICINE

## 2019-03-30 PROCEDURE — 85025 COMPLETE CBC W/AUTO DIFF WBC: CPT

## 2019-03-30 PROCEDURE — 74011636637 HC RX REV CODE- 636/637: Performed by: NURSE PRACTITIONER

## 2019-03-30 PROCEDURE — 36415 COLL VENOUS BLD VENIPUNCTURE: CPT

## 2019-03-30 PROCEDURE — 84100 ASSAY OF PHOSPHORUS: CPT

## 2019-03-30 PROCEDURE — 84439 ASSAY OF FREE THYROXINE: CPT

## 2019-03-30 PROCEDURE — 74011000258 HC RX REV CODE- 258: Performed by: INTERNAL MEDICINE

## 2019-03-30 PROCEDURE — 71046 X-RAY EXAM CHEST 2 VIEWS: CPT

## 2019-03-30 PROCEDURE — 74011636637 HC RX REV CODE- 636/637: Performed by: INTERNAL MEDICINE

## 2019-03-30 PROCEDURE — 65660000000 HC RM CCU STEPDOWN

## 2019-03-30 PROCEDURE — 82962 GLUCOSE BLOOD TEST: CPT

## 2019-03-30 RX ORDER — INSULIN LISPRO 100 [IU]/ML
8 INJECTION, SOLUTION INTRAVENOUS; SUBCUTANEOUS
Status: DISCONTINUED | OUTPATIENT
Start: 2019-03-30 | End: 2019-03-31

## 2019-03-30 RX ORDER — INSULIN GLARGINE 100 [IU]/ML
5 INJECTION, SOLUTION SUBCUTANEOUS
Status: DISCONTINUED | OUTPATIENT
Start: 2019-03-30 | End: 2019-03-30

## 2019-03-30 RX ORDER — SODIUM CHLORIDE 450 MG/100ML
100 INJECTION, SOLUTION INTRAVENOUS CONTINUOUS
Status: DISCONTINUED | OUTPATIENT
Start: 2019-03-30 | End: 2019-03-30

## 2019-03-30 RX ORDER — DEXTROSE MONOHYDRATE 50 MG/ML
25 INJECTION, SOLUTION INTRAVENOUS CONTINUOUS
Status: DISCONTINUED | OUTPATIENT
Start: 2019-03-30 | End: 2019-03-30

## 2019-03-30 RX ORDER — DEXTROSE 50 % IN WATER (D50W) INTRAVENOUS SYRINGE
25-50 AS NEEDED
Status: DISCONTINUED | OUTPATIENT
Start: 2019-03-30 | End: 2019-03-31 | Stop reason: HOSPADM

## 2019-03-30 RX ORDER — ISOPROPYL ALCOHOL 70 ML/100ML
SWAB TOPICAL
Qty: 100 PAD | Refills: 0 | Status: SHIPPED | OUTPATIENT
Start: 2019-03-30

## 2019-03-30 RX ORDER — LANCETS
EACH MISCELLANEOUS
Qty: 1 PACKAGE | Refills: 11 | Status: SHIPPED | OUTPATIENT
Start: 2019-03-30

## 2019-03-30 RX ORDER — INSULIN GLARGINE 100 [IU]/ML
24 INJECTION, SOLUTION SUBCUTANEOUS DAILY
Status: DISCONTINUED | OUTPATIENT
Start: 2019-03-31 | End: 2019-03-31

## 2019-03-30 RX ORDER — INSULIN LISPRO 100 [IU]/ML
INJECTION, SOLUTION INTRAVENOUS; SUBCUTANEOUS
Status: DISCONTINUED | OUTPATIENT
Start: 2019-03-30 | End: 2019-03-31 | Stop reason: HOSPADM

## 2019-03-30 RX ORDER — INSULIN PUMP SYRINGE, 3 ML
EACH MISCELLANEOUS
Qty: 1 KIT | Refills: 0 | Status: SHIPPED | OUTPATIENT
Start: 2019-03-30 | End: 2019-03-31

## 2019-03-30 RX ORDER — INSULIN LISPRO 100 [IU]/ML
INJECTION, SOLUTION INTRAVENOUS; SUBCUTANEOUS
Status: DISCONTINUED | OUTPATIENT
Start: 2019-03-30 | End: 2019-03-30

## 2019-03-30 RX ORDER — INSULIN GLARGINE 100 [IU]/ML
24 INJECTION, SOLUTION SUBCUTANEOUS
Status: DISCONTINUED | OUTPATIENT
Start: 2019-03-30 | End: 2019-03-30

## 2019-03-30 RX ORDER — ACETAMINOPHEN 325 MG/1
650 TABLET ORAL ONCE
Status: COMPLETED | OUTPATIENT
Start: 2019-03-30 | End: 2019-03-30

## 2019-03-30 RX ORDER — INSULIN GLARGINE 100 [IU]/ML
19 INJECTION, SOLUTION SUBCUTANEOUS
Status: COMPLETED | OUTPATIENT
Start: 2019-03-30 | End: 2019-03-30

## 2019-03-30 RX ORDER — MAGNESIUM SULFATE 100 %
16 CRYSTALS MISCELLANEOUS AS NEEDED
Status: DISCONTINUED | OUTPATIENT
Start: 2019-03-30 | End: 2019-03-31 | Stop reason: HOSPADM

## 2019-03-30 RX ORDER — ONDANSETRON 2 MG/ML
4 INJECTION INTRAMUSCULAR; INTRAVENOUS ONCE
Status: COMPLETED | OUTPATIENT
Start: 2019-03-30 | End: 2019-03-30

## 2019-03-30 RX ORDER — GLIPIZIDE 5 MG/1
10 TABLET ORAL
Status: DISCONTINUED | OUTPATIENT
Start: 2019-03-30 | End: 2019-03-30

## 2019-03-30 RX ADMIN — DIBASIC SODIUM PHOSPHATE, MONOBASIC POTASSIUM PHOSPHATE AND MONOBASIC SODIUM PHOSPHATE 2 TABLET: 852; 155; 130 TABLET ORAL at 19:07

## 2019-03-30 RX ADMIN — POTASSIUM CHLORIDE: 2 INJECTION, SOLUTION, CONCENTRATE INTRAVENOUS at 03:00

## 2019-03-30 RX ADMIN — INSULIN GLARGINE 19 UNITS: 100 INJECTION, SOLUTION SUBCUTANEOUS at 13:06

## 2019-03-30 RX ADMIN — SODIUM CHLORIDE 100 ML/HR: 450 INJECTION, SOLUTION INTRAVENOUS at 13:11

## 2019-03-30 RX ADMIN — ONDANSETRON 4 MG: 2 INJECTION INTRAMUSCULAR; INTRAVENOUS at 20:55

## 2019-03-30 RX ADMIN — INSULIN LISPRO 2 UNITS: 100 INJECTION, SOLUTION INTRAVENOUS; SUBCUTANEOUS at 21:34

## 2019-03-30 RX ADMIN — POTASSIUM CHLORIDE: 2 INJECTION, SOLUTION, CONCENTRATE INTRAVENOUS at 04:00

## 2019-03-30 RX ADMIN — INSULIN GLARGINE 5 UNITS: 100 INJECTION, SOLUTION SUBCUTANEOUS at 13:06

## 2019-03-30 RX ADMIN — ACETAMINOPHEN 650 MG: 325 TABLET ORAL at 20:55

## 2019-03-30 RX ADMIN — INSULIN LISPRO 8 UNITS: 100 INJECTION, SOLUTION INTRAVENOUS; SUBCUTANEOUS at 16:30

## 2019-03-30 RX ADMIN — DEXTROSE MONOHYDRATE 25 ML/HR: 5 INJECTION, SOLUTION INTRAVENOUS at 02:00

## 2019-03-30 RX ADMIN — SERTRALINE HYDROCHLORIDE 100 MG: 50 TABLET ORAL at 08:54

## 2019-03-30 NOTE — PROGRESS NOTES
[facility] Hospitalist Group Progress Note Patient: Lucien Reece Age: 51 y.o. : 1970 MR#: [id number] SSN: [id number] Date: 3/30/2019 Subjective:  
 
Reports feeling tired, no other complaints including no SOB, CP, palpitations, n/v Assessment/Plan: 1. Hyperglycemia - much improved in setting of #2 2. Likely Type 2 DM, uncontrolled - A1c 10.7 upon admission; d/c insulin drip / lantus started, mealtime insulin and SSI per discussion with endocrine. Endocrinology will see today. 3. Hypernatremia - 1/2 NS started per endocrine recs; ? In setting of #2; encourage oral intake and recheck later tonight 4. Hypercalcemia - mild elevation, last 9.7 in 08/15 5. TIA, likely prerenal - improved, cont hydration; follow 6. Hyperlipidemia - cont statin 7. Depression - cont zoloft Additional Notes:   
 
Case discussed with:  [x]Patient  []Family  [x]Nursing  []Case Management DVT Prophylaxis:  []Lovenox  []Hep SQ  [x]SCDs  []Coumadin   []On Heparin gtt Objective:  
VS:  
Visit Vitals /88 Pulse 99 Temp 99.2 °F (37.3 °C) Resp 18 Ht 5' 4\" (1.626 m) Wt 96.8 kg (213 lb 6.4 oz) LMP 2018 SpO2 94% BMI 36.63 kg/m² Tmax/24hrs: Temp (24hrs), Av.7 °F (37.1 °C), Min:98.5 °F (36.9 °C), Max:99.2 °F (37.3 °C) Intake/Output Summary (Last 24 hours) at 3/30/2019 1611 Last data filed at 3/30/2019 4565 Gross per 24 hour Intake 0 ml Output  Net 0 ml General:  Alert, NAD Cardiovascular:  Reg rhythm, mild tachy intermittent Pulmonary:  LSC throughout; respiratory effort WNL 
GI:  +BS in all four quadrants, soft, non-tender Extremities:  No edema; 2+ dorsalis pedis pulses bilaterally Neuro: alert and oriented x 3 Family contact:  Madi Landeros [phone] Labs:   
Recent Results (from the past 24 hour(s)) GLUCOSE, POC Collection Time: 19  4:26 PM  
Result Value Ref Range Glucose (POC) 572 (HH) 70 - 110 mg/dL Daiva Lupton Collection Time: 03/29/19  4:28 PM  
Result Value Ref Range Glucose 572 mg/dL Insulin order 20.5 units/hour Insulin adminstered 20.5 units/hour Multiplier 0.040 Low target 140 mg/dL High target 180 mg/dL D50 order 0.0 ml  
 D50 administered 0.00 ml Minutes until next BG 60 min Order initials la Administered initials la GLUCOSE, POC Collection Time: 03/29/19  5:57 PM  
Result Value Ref Range Glucose (POC) 485 (HH) 70 - 110 mg/dL Daiva Lupton Collection Time: 03/29/19  5:58 PM  
Result Value Ref Range Glucose 485 mg/dL Insulin order 21.3 units/hour Insulin adminstered 21.3 units/hour Multiplier 0.050 Low target 140 mg/dL High target 180 mg/dL D50 order 0.0 ml  
 D50 administered 0.00 ml Minutes until next BG 60 min Order initials ae Administered initials ae   
GLUCOSE, POC Collection Time: 03/29/19  6:45 PM  
Result Value Ref Range Glucose (POC) 355 (H) 70 - 110 mg/dL [facility] Collection Time: 03/29/19  7:24 PM  
Result Value Ref Range Glucose 355 mg/dL Insulin order 14.8 units/hour Insulin adminstered 14.8 units/hour Multiplier 0.050 Low target 140 mg/dL High target 180 mg/dL D50 order 0.0 ml  
 D50 administered 0.00 ml Minutes until next BG 60 min Order initials ae Administered initials ae MAGNESIUM Collection Time: 03/29/19  7:26 PM  
Result Value Ref Range Magnesium 3.8 (H) 1.6 - 2.6 mg/dL GLUCOSE, POC Collection Time: 03/29/19  8:35 PM  
Result Value Ref Range Glucose (POC) 217 (H) 70 - 110 mg/dL [facility] Lupton Collection Time: 03/29/19  8:36 PM  
Result Value Ref Range Glucose 217 mg/dL Insulin order 7.9 units/hour Insulin adminstered 7.9 units/hour Multiplier 0.050 Low target 140 mg/dL High target 180 mg/dL D50 order 0.0 ml  
 D50 administered 0.00 ml Minutes until next BG 60 min Order initials AZEB   
 Administered initials AZEB   
GLUCOSE, POC Collection Time: 03/29/19  9:58 PM  
Result Value Ref Range Glucose (POC) 162 (H) 70 - 110 mg/dL Leidy Brand Collection Time: 03/29/19  9:59 PM  
Result Value Ref Range Glucose 162 mg/dL Insulin order 5.1 units/hour Insulin adminstered 5.1 units/hour Multiplier 0.050 Low target 140 mg/dL High target 180 mg/dL D50 order 0.0 ml  
 D50 administered 0.00 ml Minutes until next BG 60 min Order initials azeb   
 Administered initials azeb   
GLUCOSE, POC Collection Time: 03/29/19 11:01 PM  
Result Value Ref Range Glucose (POC) 175 (H) 70 - 110 mg/dL Leidy Brand Collection Time: 03/29/19 11:02 PM  
Result Value Ref Range Glucose 175 mg/dL Insulin order 5.8 units/hour Insulin adminstered 5.8 units/hour Multiplier 0.050 Low target 140 mg/dL High target 180 mg/dL D50 order 0.0 ml  
 D50 administered 0.00 ml Minutes until next BG 60 min Order initials eladia Administered initials eladia GLUCOSE, POC Collection Time: 03/30/19 12:21 AM  
Result Value Ref Range Glucose (POC) 185 (H) 70 - 110 mg/dL Leidy Brand Collection Time: 03/30/19 12:22 AM  
Result Value Ref Range Glucose 185 mg/dL Insulin order 7.5 units/hour Insulin adminstered 7.5 units/hour Multiplier 0.060 Low target 140 mg/dL High target 180 mg/dL D50 order 0.0 ml  
 D50 administered 0.00 ml Minutes until next BG 60 min Order initials azeb   
 Administered initials azeb   
GLUCOSE, POC Collection Time: 03/30/19  1:23 AM  
Result Value Ref Range Glucose (POC) 191 (H) 70 - 110 mg/dL Leidy Brand Collection Time: 03/30/19  1:23 AM  
Result Value Ref Range Glucose 191 mg/dL Insulin order 9.2 units/hour Insulin adminstered 9.2 units/hour Multiplier 0.070 Low target 140 mg/dL High target 180 mg/dL  D50 order 0.0 ml  
 D50 administered 0.00 ml Minutes until next BG 60 min Order initials nino   
 Administered initials nino   
GLUCOSE, POC Collection Time: 03/30/19  2:26 AM  
Result Value Ref Range Glucose (POC) 241 (H) 70 - 110 mg/dL George Burris Collection Time: 03/30/19  2:31 AM  
Result Value Ref Range Glucose 241 mg/dL Insulin order 14.5 units/hour Insulin adminstered 14.5 units/hour Multiplier 0.080 Low target 140 mg/dL High target 180 mg/dL D50 order 0.0 ml  
 D50 administered 0.00 ml Minutes until next BG 60 min Order initials nino   
 Administered initials nino   
GLUCOSE, POC Collection Time: 03/30/19  3:39 AM  
Result Value Ref Range Glucose (POC) 247 (H) 70 - 110 mg/dL George Burris Collection Time: 03/30/19  3:39 AM  
Result Value Ref Range Glucose 247 mg/dL Insulin order 16.8 units/hour Insulin adminstered 16.8 units/hour Multiplier 0.090 Low target 140 mg/dL High target 180 mg/dL D50 order 0.0 ml  
 D50 administered 0.00 ml Minutes until next BG 60 min Order initials nino   
 Administered initials nino   
GLUCOSE, POC Collection Time: 03/30/19  4:53 AM  
Result Value Ref Range Glucose (POC) 247 (H) 70 - 110 mg/dL George Burris Collection Time: 03/30/19  4:54 AM  
Result Value Ref Range Glucose 247 mg/dL Insulin order 18.7 units/hour Insulin adminstered 18.7 units/hour Multiplier 0.100 Low target 140 mg/dL High target 180 mg/dL D50 order 0.0 ml  
 D50 administered 0.00 ml Minutes until next BG 60 min Order initials nino   
 Administered initials nino   
MAGNESIUM Collection Time: 03/30/19  5:50 AM  
Result Value Ref Range Magnesium 3.7 (H) 1.6 - 2.6 mg/dL METABOLIC PANEL, BASIC Collection Time: 03/30/19  5:50 AM  
Result Value Ref Range Sodium 150 (H) 136 - 145 mmol/L Potassium 4.5 3.5 - 5.5 mmol/L  Chloride 117 (H) 100 - 108 mmol/L  
 CO2 27 21 - 32 mmol/L  
 Anion gap 6 3.0 - 18 mmol/L Glucose 234 (H) 74 - 99 mg/dL BUN 52 (H) 7.0 - 18 MG/DL Creatinine 1.32 (H) 0.6 - 1.3 MG/DL  
 BUN/Creatinine ratio 39 (H) 12 - 20 GFR est AA 52 (L) >60 ml/min/1.73m2 GFR est non-AA 43 (L) >60 ml/min/1.73m2 Calcium 9.4 8.5 - 10.1 MG/DL  
CBC WITH AUTOMATED DIFF Collection Time: 03/30/19  5:50 AM  
Result Value Ref Range WBC 15.0 (H) 4.6 - 13.2 K/uL  
 RBC 5.69 (H) 4.20 - 5.30 M/uL  
 HGB 15.6 12.0 - 16.0 g/dL HCT 46.6 (H) 35.0 - 45.0 % MCV 81.9 74.0 - 97.0 FL  
 MCH 27.4 24.0 - 34.0 PG  
 MCHC 33.5 31.0 - 37.0 g/dL  
 RDW 12.5 11.6 - 14.5 % PLATELET 590 928 - 109 K/uL MPV 12.1 (H) 9.2 - 11.8 FL  
 NEUTROPHILS 81 (H) 42 - 75 % BAND NEUTROPHILS 5 0 - 5 % LYMPHOCYTES 12 (L) 20 - 51 % MONOCYTES 2 2 - 9 % EOSINOPHILS 0 0 - 5 % BASOPHILS 0 0 - 3 %  
 ABS. NEUTROPHILS 12.9 (H) 1.8 - 8.0 K/UL  
 ABS. LYMPHOCYTES 1.8 0.8 - 3.5 K/UL  
 ABS. MONOCYTES 0.3 0 - 1.0 K/UL  
 ABS. EOSINOPHILS 0.0 0.0 - 0.4 K/UL  
 ABS. BASOPHILS 0.0 0.0 - 0.06 K/UL  
 DF AUTOMATED PLATELET COMMENTS ADEQUATE PLATELETS    
 RBC COMMENTS NORMOCYTIC, NORMOCHROMIC    
GLUCOSE, POC Collection Time: 03/30/19  5:57 AM  
Result Value Ref Range Glucose (POC) 210 (H) 70 - 110 mg/dL Artelia Clarke Collection Time: 03/30/19  5:57 AM  
Result Value Ref Range Glucose 210 mg/dL Insulin order 16.5 units/hour Insulin adminstered 16.5 units/hour Multiplier 0.110 Low target 140 mg/dL High target 180 mg/dL D50 order 0.0 ml  
 D50 administered 0.00 ml Minutes until next BG 60 min Order initials nino   
 Administered initials nino   
GLUCOSE, POC Collection Time: 03/30/19  7:02 AM  
Result Value Ref Range Glucose (POC) 194 (H) 70 - 110 mg/dL Artelia Clarke Collection Time: 03/30/19  7:02 AM  
Result Value Ref Range Glucose 194 mg/dL Insulin order 16.1 units/hour Insulin adminstered 16.1 units/hour  Multiplier 0.120   
 Low target 140 mg/dL High target 180 mg/dL D50 order 0.0 ml  
 D50 administered 0.00 ml Minutes until next BG 60 min Order initials nino   
 Administered initials nino   
GLUCOSE, POC Collection Time: 03/30/19  7:25 AM  
Result Value Ref Range Glucose (POC) 195 (H) 70 - 110 mg/dL GLUCOSE, POC Collection Time: 03/30/19  8:11 AM  
Result Value Ref Range Glucose (POC) 174 (H) 70 - 110 mg/dL Dmitri Leak Collection Time: 03/30/19  8:14 AM  
Result Value Ref Range Glucose 174 mg/dL Insulin order 13.7 units/hour Insulin adminstered 13.7 units/hour Multiplier 0.120 Low target 140 mg/dL High target 180 mg/dL D50 order 0.0 ml  
 D50 administered 0.00 ml Minutes until next BG 60 min Order initials jmt Administered initials jmt GLUCOSE, POC Collection Time: 03/30/19  9:19 AM  
Result Value Ref Range Glucose (POC) 142 (H) 70 - 110 mg/dL Dmitri Leak Collection Time: 03/30/19  9:21 AM  
Result Value Ref Range Glucose 142 mg/dL Insulin order 9.8 units/hour Insulin adminstered 9.8 units/hour Multiplier 0.120 Low target 140 mg/dL High target 180 mg/dL D50 order 0.0 ml  
 D50 administered 0.00 ml Minutes until next BG 60 min Order initials JMT Administered initials JMT METABOLIC PANEL, BASIC Collection Time: 03/30/19  9:44 AM  
Result Value Ref Range Sodium 154 (H) 136 - 145 mmol/L Potassium 4.3 3.5 - 5.5 mmol/L Chloride 116 (H) 100 - 108 mmol/L  
 CO2 32 21 - 32 mmol/L Anion gap 6 3.0 - 18 mmol/L Glucose 141 (H) 74 - 99 mg/dL BUN 45 (H) 7.0 - 18 MG/DL Creatinine 1.30 0.6 - 1.3 MG/DL  
 BUN/Creatinine ratio 35 (H) 12 - 20 GFR est AA 53 (L) >60 ml/min/1.73m2 GFR est non-AA 44 (L) >60 ml/min/1.73m2 Calcium 10.2 (H) 8.5 - 10.1 MG/DL MAGNESIUM Collection Time: 03/30/19  9:44 AM  
Result Value Ref Range Magnesium 3.7 (H) 1.6 - 2.6 mg/dL GLUCOSE, POC  
 Collection Time: 03/30/19 10:26 AM  
Result Value Ref Range Glucose (POC) 124 (H) 70 - 110 mg/dL Keanu Gain Collection Time: 03/30/19 10:27 AM  
Result Value Ref Range Glucose 124 mg/dL Insulin order 6.1 units/hour Insulin adminstered 6.1 units/hour Multiplier 0.096 Low target 140 mg/dL High target 180 mg/dL D50 order 0.0 ml  
 D50 administered 0.00 ml Minutes until next BG 60 min Order initials JMT Administered initials JMT   
GLUCOSE, POC Collection Time: 03/30/19 11:33 AM  
Result Value Ref Range Glucose (POC) 71 70 - 110 mg/dL Keanu Gain Collection Time: 03/30/19 11:34 AM  
Result Value Ref Range Glucose 71 mg/dL Insulin order 0.8 units/hour Insulin adminstered 0.8 units/hour Multiplier 0.077 Low target 140 mg/dL High target 180 mg/dL D50 order 0.0 ml  
 D50 administered 0.00 ml Minutes until next BG 60 min Order initials JMT Administered initials JMT   
GLUCOSE, POC Collection Time: 03/30/19 12:36 PM  
Result Value Ref Range Glucose (POC) 97 70 - 110 mg/dL Keanu Gain Collection Time: 03/30/19 12:37 PM  
Result Value Ref Range Glucose 97 mg/dL Insulin order 2.3 units/hour Insulin adminstered 2.3 units/hour Multiplier 0.062 Low target 140 mg/dL High target 180 mg/dL D50 order 0.0 ml  
 D50 administered 0.00 ml Minutes until next BG 60 min Order initials JMT Administered initials JMT   
GLUCOSE, POC Collection Time: 03/30/19  1:40 PM  
Result Value Ref Range Glucose (POC) 116 (H) 70 - 110 mg/dL Keanu Gain Collection Time: 03/30/19  1:42 PM  
Result Value Ref Range Glucose 116 mg/dL Insulin order 2.8 units/hour Insulin adminstered 2.8 units/hour Multiplier 0.050 Low target 140 mg/dL High target 180 mg/dL D50 order 0.0 ml  
 D50 administered 0.00 ml Minutes until next BG 60 min Order initials [facility]  Administered initials [facility]   
 TSH 3RD GENERATION Collection Time: 03/30/19  1:52 PM  
Result Value Ref Range TSH 0.83 0.36 - 3.74 uIU/mL T4, FREE Collection Time: 03/30/19  1:52 PM  
Result Value Ref Range T4, Free 0.9 0.7 - 1.5 NG/DL  
PTH INTACT Collection Time: 03/30/19  1:52 PM  
Result Value Ref Range Calcium 9.5 8.5 - 10.1 MG/DL  
 PTH, Intact PENDING pg/mL PHOSPHORUS Collection Time: 03/30/19  1:52 PM  
Result Value Ref Range Phosphorus 1.6 (L) 2.5 - 4.9 MG/DL  
METABOLIC PANEL, BASIC Collection Time: 03/30/19  1:52 PM  
Result Value Ref Range Sodium 154 (H) 136 - 145 mmol/L Potassium 4.0 3.5 - 5.5 mmol/L Chloride 116 (H) 100 - 108 mmol/L  
 CO2 32 21 - 32 mmol/L Anion gap 6 3.0 - 18 mmol/L Glucose 143 (H) 74 - 99 mg/dL BUN 37 (H) 7.0 - 18 MG/DL Creatinine 1.13 0.6 - 1.3 MG/DL  
 BUN/Creatinine ratio 33 (H) 12 - 20 GFR est AA >60 >60 ml/min/1.73m2 GFR est non-AA 51 (L) >60 ml/min/1.73m2 Calcium 9.8 8.5 - 10.1 MG/DL MAGNESIUM Collection Time: 03/30/19  1:52 PM  
Result Value Ref Range Magnesium 3.3 (H) 1.6 - 2.6 mg/dL Signed By: Zee Dent NP March 30, 2019

## 2019-03-30 NOTE — PROGRESS NOTES
The patient was seen and examined independently. Please read my note for additional detail. The plan was discussed with APC. Feeling much better. Eating and drinking well. No chest or abdominal pain. No SOB or cough. No nausea or vomiting. No dizziness. Husbands is at bedside /75   Pulse 99   Temp 99.4 °F (37.4 °C)   Resp 20   Ht 5' 4\" (1.626 m)   Wt 96.8 kg (213 lb 6.4 oz)   LMP 11/01/2018   SpO2 95%   BMI 36.63 kg/m² General appearance - alert, well appearing, and in no distress Eyes - sclera anicteric Nose - no nasal discharge. Neck - supple, no significant adenopathy, trachea is midline Chest - good air entry noted in bases, no wheezes Heart - S1 and S2 normal 
Abdomen - soft, nontender, nondistended, Bowel sounds present Neurological - alert, oriented, normal speech, no focal findings noted Musculoskeletal - no joint tenderness or swelling of knees Extremities - no pedal edema noted 1. New onset DM - will provide her glucometer script. Cont current management 2. Hyperglycemia, better. Off insulin drip 3. Stress induced leukocytosis - will monitor 4. Hypernatremia could be from hyperglycemia induced dehydration - advised to increase water intake and monitor Possible discharge home tomorrow BMP:  
Lab Results Component Value Date/Time  (H) 03/30/2019 01:52 PM  
 K 4.0 03/30/2019 01:52 PM  
  (H) 03/30/2019 01:52 PM  
 CO2 32 03/30/2019 01:52 PM  
 AGAP 6 03/30/2019 01:52 PM  
  (H) 03/30/2019 01:52 PM  
 BUN 37 (H) 03/30/2019 01:52 PM  
 CREA 1.13 03/30/2019 01:52 PM  
 GFRAA >60 03/30/2019 01:52 PM  
 GFRNA 51 (L) 03/30/2019 01:52 PM  
 
CBC:  
Lab Results Component Value Date/Time  WBC 15.0 (H) 03/30/2019 05:50 AM  
 HGB 15.6 03/30/2019 05:50 AM  
 HCT 46.6 (H) 03/30/2019 05:50 AM  
  03/30/2019 05:50 AM

## 2019-03-30 NOTE — ROUTINE PROCESS
Bedside and Verbal shift change report given to [address] [place] [address] (oncoming nurse) by Brad Pierce (offgoing nurse). Report included the following information SBAR, Kardex, MAR and Cardiac Rhythm NSR.

## 2019-03-30 NOTE — CONSULTS
Hospitalist Consultation Note    NAME:  Pankaj Pimentel   :   1970   MRN:   [id number]     ATTENDING: Aaliyah Brady MD  PCP:  Antonio Sloan MD    Date/Time:  3/30/2019 2:32 PM  Subjective:   REQUESTING PHYSICIAN:  REASON FOR CONSULT:      Darion Moore is a 51 y.o.  female who I was asked to see for newly diagnosed DM. This is a pt with a medical history significant for HTN, HLD, and depression who presented to the ED after having been seen at [facility] for fatigue, polyuria, and dry for ~ 1 week prior to admission. At [facility] she was found to have BG > 700 and was set to the ED for further evaluation and treatment. Here she was found to have serum glucose of 980,  AG 14 ,bicarb 24, + Ur Ketones of 15, Ur Glucose > 1000 and was admitted for newly diagnosed diabetes. She states that she has no numbness/tingling in feet, no blurry vision, no abd pain , (-) N/V, + polyuria, + polydipsia, + dry mouth + fatigue for the last week. She has a + family history for DM in her father (dx ~ 61), great aunt (age 80). Past Medical History:   Diagnosis Date    Depression     High cholesterol     Hypertension         History reviewed. No pertinent surgical history. Social History     Tobacco Use    Smoking status: Not on file   Substance Use Topics    Alcohol use: Not on file        History reviewed. No pertinent family history. No Known Allergies     Prior to Admission medications    Medication Sig Start Date End Date Taking? Authorizing Provider   B-complex with vitamin C (VITAMIN B COMPLEX-C PO) Take  by mouth. Yes Sylvia, MD Martine   sertraline (ZOLOFT) 100 mg tablet Take 100 mg by mouth daily. Yes Sylvia, MD Martine   lisinopril-hydroCHLOROthiazide (PRINZIDE, ZESTORETIC) 10-12.5 mg per tablet Take 1 Tab by mouth daily. Yes Martine Ward MD   simvastatin (ZOCOR) 40 mg tablet Take 40 mg by mouth nightly.    Yes Sylvia, MD Martine       REVIEW OF SYSTEMS:     [x] as per HPI, otherwise negative      Objective: VITALS:    Visit Vitals  /88   Pulse 99   Temp 99.2 °F (37.3 °C)   Resp 18   Ht 5' 4\" (1.626 m)   Wt 96.8 kg (213 lb 6.4 oz)   LMP 2018   SpO2 94%   BMI 36.63 kg/m²     Temp (24hrs), Av.7 °F (37.1 °C), Min:98.5 °F (36.9 °C), Max:99.2 °F (37.3 °C)       PHYSICAL EXAM:   General: A&Ox3 NAD  HEENT: NC/AT EOMI (-) LAD  CV: RR , no murmurs appreciated  Lungs: CTA b/l  Abd: + BS NT ND  Ext: decpower and strength in UE, good strength in LE  Skin: no rashes  Neuro: unable to assess reflexes  Psych: good mood normal affect      LAB DATA REVIEWED:      Recent Labs     19  1352 19  0944 19  0550 19  1144   NA  --  154* 150* 133*   K  --  4.3 4.5 4.9   CL  --  116* 117* 95*   CO2  --  32 27 24   BUN  --  45* 52* 59*   CREA  --  1.30 1.32* 2.03*   GLU  --  141* 234* 980*   PHOS  --   --   --  8.7*   CA 9.5 10.2* 9.4 10.1   ALB  --   --   --  4.8   WBC  --   --  15.0* 15.0*   HGB  --   --  15.6 16.1*   HCT  --   --  46.6* 47.8*   PLT  --   --  185 256     Lab Results   Component Value Date/Time    Glucose 143 (H) 2019 01:52 PM    Glucose (POC) 116 (H) 2019 01:40 PM      Results for [employer] (MRN [id number]) as of 3/30/2019 14:28   Ref. Range 3/30/2019 07:25 3/30/2019 08:11 3/30/2019 09:19 3/30/2019 10:26 3/30/2019 11:33 3/30/2019 12:36 3/30/2019 13:40   GLUCOSE,FAST - POC Latest Ref Range: 70 - 110 mg/dL 195 (H) 174 (H) 142 (H) 124 (H) 71 97 116 (H)         Recommendations/Plan:      Active Problems:    Type II diabetes mellitus, uncontrolled ([facility] 75.) (3/30/2019)       ___________________________________________________  RECOMMENDATIONS:  This is a 50 yo AAF admitted for hyperglycemia with newly diagnosed diabetes, most likely type 2. A1c on admission was 10.7%. Given the high A1c, she will need to start basal/bolus insulin therapy to help with controlling her blood sugar.  Goal is for A1c to be <7% and currently insulin is the most reliable way to accomplish this goal. She was not in DKA on admission so no likely to be Type 1, but if there are concerns, then insulin will be the appropriate therapy as well. In regards to her electrolyte abnormalities of hypernatremia, hypercalcemia, hyperphosphatemia, and hypermagnesemia, these may be related to dehydration from her hyperglycemia since glucose causes a significant osmotic diuresis and pts can have upward to 6-8 L of fluid loss on presentation. Also hyperphosphatemia can be sen in severe hyperglycemia so should improve with improvement in blood sugars. Also she did have some renal impairment,with an elevated SCr of 2.03 and GFR of 33 which can contribute to both hyperphosphatemia, hypercalcemia, and hypermagnesemia. DM: newly diagnosed on this admission, m/l type 2. A1c 10.7%    -extensively discussed need for rapid sugar control to help avoid long term consequences of uncontrolled DM    -would start on basal/bolus/correctional insulin regimen with weight based dosing (0.5 units/kg/day) of   Lantus 24 units sq daily, 1st dose now  Humalog 8 units sq tidac  Medium dose correction scale qac/hs    -she would benefit from starting metformin as well, but given her GFR, will hold off starting  For now until GFR improves (>60)    -will need diabetes education to know and understand how to check sugars with glucometer and to know ad understand how to draw up and administer insulin    -will need  Rx for insulin as well as diabetes supplies and should have glucometer and test strip given on discharge.    -will need to [facility] with Dr. Dale Mckee or Shea Dennis in 2-4 weeks after discharge for follow and further discussion of other treatments once A1c is at goal (<7%)    Electrolyte Derangements: most likely due to impaired renal function as well as hyperglycemia.     -would make sure pt is receiving adequate hydration since m/l is upward to 6L down due to severe hyperglycemia.  Would encourage PO free water intake as well aggressive IVF to make pt euvolemic.    -would recheck BMP with Mg and Phos ~ 8-12 hours after sugars and fluid status has normalized. -if still abnormal, then may need to do further work up to determine etiology of electrolyte derangements.     Thank you for the consult, will continue to follow pt with you while admitted to the hospital.    Time Spent: 90 minutes    ___________________________________________________    Care Plan discussed with:    [x]Patient   [x]Family    []Care Manager  [x]Nursing   [x]Attending    Total Time :   90   minutes    []Total Critical Care Time:     ___________________________________________________  Endocrinologist: Lalita Bhakta MD

## 2019-03-31 VITALS
HEIGHT: 64 IN | OXYGEN SATURATION: 96 % | SYSTOLIC BLOOD PRESSURE: 129 MMHG | BODY MASS INDEX: 36.43 KG/M2 | HEART RATE: 83 BPM | DIASTOLIC BLOOD PRESSURE: 84 MMHG | RESPIRATION RATE: 20 BRPM | WEIGHT: 213.4 LBS | TEMPERATURE: 99.1 F

## 2019-03-31 PROBLEM — D72.829 LEUKOCYTOSIS: Status: ACTIVE | Noted: 2019-03-29

## 2019-03-31 PROBLEM — E87.0 HYPERNATREMIA: Status: ACTIVE | Noted: 2019-03-29

## 2019-03-31 PROBLEM — E11.00 HYPEROSMOLAR NON-KETOTIC STATE IN PATIENT WITH TYPE 2 DIABETES MELLITUS (HCC): Status: ACTIVE | Noted: 2019-03-29

## 2019-03-31 LAB
ANION GAP SERPL CALC-SCNC: 4 MMOL/L (ref 3–18)
ATRIAL RATE: 108 BPM
BASOPHILS # BLD: 0 K/UL (ref 0–0.1)
BASOPHILS NFR BLD: 0 % (ref 0–2)
BUN SERPL-MCNC: 29 MG/DL (ref 7–18)
BUN/CREAT SERPL: 29 (ref 12–20)
CALCIUM SERPL-MCNC: 9.5 MG/DL (ref 8.5–10.1)
CALCIUM SERPL-MCNC: 9.5 MG/DL (ref 8.5–10.1)
CALCULATED P AXIS, ECG09: 50 DEGREES
CALCULATED R AXIS, ECG10: -22 DEGREES
CALCULATED T AXIS, ECG11: 40 DEGREES
CHLORIDE SERPL-SCNC: 110 MMOL/L (ref 100–108)
CO2 SERPL-SCNC: 29 MMOL/L (ref 21–32)
CREAT SERPL-MCNC: 1.01 MG/DL (ref 0.6–1.3)
DIAGNOSIS, 93000: NORMAL
DIFFERENTIAL METHOD BLD: ABNORMAL
EOSINOPHIL # BLD: 0 K/UL (ref 0–0.4)
EOSINOPHIL NFR BLD: 0 % (ref 0–5)
ERYTHROCYTE [DISTWIDTH] IN BLOOD BY AUTOMATED COUNT: 12.3 % (ref 11.6–14.5)
GLUCOSE BLD STRIP.AUTO-MCNC: 215 MG/DL (ref 70–110)
GLUCOSE BLD STRIP.AUTO-MCNC: 341 MG/DL (ref 70–110)
GLUCOSE BLD STRIP.AUTO-MCNC: 375 MG/DL (ref 70–110)
GLUCOSE SERPL-MCNC: 323 MG/DL (ref 74–99)
HCT VFR BLD AUTO: 44.3 % (ref 35–45)
HGB BLD-MCNC: 14.5 G/DL (ref 12–16)
LYMPHOCYTES # BLD: 2.3 K/UL (ref 0.9–3.6)
LYMPHOCYTES NFR BLD: 22 % (ref 21–52)
MCH RBC QN AUTO: 27.3 PG (ref 24–34)
MCHC RBC AUTO-ENTMCNC: 32.7 G/DL (ref 31–37)
MCV RBC AUTO: 83.3 FL (ref 74–97)
MONOCYTES # BLD: 0.6 K/UL (ref 0.05–1.2)
MONOCYTES NFR BLD: 6 % (ref 3–10)
NEUTS SEG # BLD: 7.5 K/UL (ref 1.8–8)
NEUTS SEG NFR BLD: 72 % (ref 40–73)
P-R INTERVAL, ECG05: 134 MS
PLATELET # BLD AUTO: 191 K/UL (ref 135–420)
PMV BLD AUTO: 11.9 FL (ref 9.2–11.8)
POTASSIUM SERPL-SCNC: 4.3 MMOL/L (ref 3.5–5.5)
PTH-INTACT SERPL-MCNC: 57.3 PG/ML (ref 18.4–88)
Q-T INTERVAL, ECG07: 356 MS
QRS DURATION, ECG06: 96 MS
QTC CALCULATION (BEZET), ECG08: 477 MS
RBC # BLD AUTO: 5.32 M/UL (ref 4.2–5.3)
SODIUM SERPL-SCNC: 143 MMOL/L (ref 136–145)
VENTRICULAR RATE, ECG03: 108 BPM
WBC # BLD AUTO: 10.5 K/UL (ref 4.6–13.2)

## 2019-03-31 PROCEDURE — 85025 COMPLETE CBC W/AUTO DIFF WBC: CPT

## 2019-03-31 PROCEDURE — 82962 GLUCOSE BLOOD TEST: CPT

## 2019-03-31 PROCEDURE — 74011636637 HC RX REV CODE- 636/637: Performed by: INTERNAL MEDICINE

## 2019-03-31 PROCEDURE — 74011636637 HC RX REV CODE- 636/637: Performed by: NURSE PRACTITIONER

## 2019-03-31 PROCEDURE — 74011250637 HC RX REV CODE- 250/637: Performed by: INTERNAL MEDICINE

## 2019-03-31 PROCEDURE — 97165 OT EVAL LOW COMPLEX 30 MIN: CPT

## 2019-03-31 PROCEDURE — 97161 PT EVAL LOW COMPLEX 20 MIN: CPT

## 2019-03-31 PROCEDURE — 80048 BASIC METABOLIC PNL TOTAL CA: CPT

## 2019-03-31 PROCEDURE — 36415 COLL VENOUS BLD VENIPUNCTURE: CPT

## 2019-03-31 RX ORDER — INSULIN LISPRO 100 [IU]/ML
INJECTION, SOLUTION INTRAVENOUS; SUBCUTANEOUS
Qty: 1 VIAL | Refills: 0 | Status: SHIPPED | OUTPATIENT
Start: 2019-03-31 | End: 2019-03-31

## 2019-03-31 RX ORDER — METFORMIN HYDROCHLORIDE 500 MG/1
500 TABLET ORAL 2 TIMES DAILY WITH MEALS
Qty: 60 TAB | Refills: 0 | Status: SHIPPED | OUTPATIENT
Start: 2019-03-31 | End: 2019-05-02 | Stop reason: SDUPTHER

## 2019-03-31 RX ORDER — METFORMIN HYDROCHLORIDE 500 MG/1
500 TABLET ORAL 2 TIMES DAILY WITH MEALS
Status: DISCONTINUED | OUTPATIENT
Start: 2019-03-31 | End: 2019-03-31 | Stop reason: HOSPADM

## 2019-03-31 RX ORDER — PEN NEEDLE, DIABETIC 30 GX3/16"
NEEDLE, DISPOSABLE MISCELLANEOUS
Qty: 1 PACKAGE | Refills: 11 | Status: SHIPPED | OUTPATIENT
Start: 2019-03-31

## 2019-03-31 RX ORDER — INSULIN LISPRO 100 [IU]/ML
INJECTION, SOLUTION INTRAVENOUS; SUBCUTANEOUS
Qty: 1 PACKAGE | Refills: 0 | Status: SHIPPED | OUTPATIENT
Start: 2019-03-31 | End: 2019-08-02 | Stop reason: SDUPTHER

## 2019-03-31 RX ORDER — INSULIN GLARGINE 100 [IU]/ML
35 INJECTION, SOLUTION SUBCUTANEOUS DAILY
Qty: 1 VIAL | Refills: 0 | Status: SHIPPED | OUTPATIENT
Start: 2019-03-31 | End: 2019-03-31

## 2019-03-31 RX ORDER — INSULIN LISPRO 100 [IU]/ML
10 INJECTION, SOLUTION INTRAVENOUS; SUBCUTANEOUS
Status: DISCONTINUED | OUTPATIENT
Start: 2019-03-31 | End: 2019-03-31 | Stop reason: HOSPADM

## 2019-03-31 RX ORDER — INSULIN GLARGINE 100 [IU]/ML
35 INJECTION, SOLUTION SUBCUTANEOUS DAILY
Qty: 5 PEN | Refills: 0 | Status: SHIPPED | OUTPATIENT
Start: 2019-03-31 | End: 2019-05-02 | Stop reason: SDUPTHER

## 2019-03-31 RX ORDER — INSULIN GLARGINE 100 [IU]/ML
30 INJECTION, SOLUTION SUBCUTANEOUS DAILY
Status: DISCONTINUED | OUTPATIENT
Start: 2019-03-31 | End: 2019-03-31 | Stop reason: HOSPADM

## 2019-03-31 RX ADMIN — INSULIN LISPRO 10 UNITS: 100 INJECTION, SOLUTION INTRAVENOUS; SUBCUTANEOUS at 12:22

## 2019-03-31 RX ADMIN — METFORMIN HYDROCHLORIDE 500 MG: 500 TABLET ORAL at 17:00

## 2019-03-31 RX ADMIN — INSULIN LISPRO 10 UNITS: 100 INJECTION, SOLUTION INTRAVENOUS; SUBCUTANEOUS at 16:30

## 2019-03-31 RX ADMIN — INSULIN LISPRO 8 UNITS: 100 INJECTION, SOLUTION INTRAVENOUS; SUBCUTANEOUS at 08:17

## 2019-03-31 RX ADMIN — DIBASIC SODIUM PHOSPHATE, MONOBASIC POTASSIUM PHOSPHATE AND MONOBASIC SODIUM PHOSPHATE 2 TABLET: 852; 155; 130 TABLET ORAL at 08:15

## 2019-03-31 RX ADMIN — INSULIN LISPRO 4 UNITS: 100 INJECTION, SOLUTION INTRAVENOUS; SUBCUTANEOUS at 16:30

## 2019-03-31 RX ADMIN — INSULIN LISPRO 8 UNITS: 100 INJECTION, SOLUTION INTRAVENOUS; SUBCUTANEOUS at 08:15

## 2019-03-31 RX ADMIN — SERTRALINE HYDROCHLORIDE 100 MG: 50 TABLET ORAL at 08:14

## 2019-03-31 RX ADMIN — INSULIN GLARGINE 30 UNITS: 100 INJECTION, SOLUTION SUBCUTANEOUS at 11:47

## 2019-03-31 NOTE — PROGRESS NOTES
NUTRITION Nutrition Consult: Diet Education RECOMMENDATIONS / PLAN:  
 
No additional nutritional interventions indicated at this time. Please consult nutrition if further nutrtion intervention is needed. Will re-screen this patient per policy. NUTRITION INTERVENTIONS:  
 
[x] Meals/Snacks: modified composition 
[x] Nutrition counseling/education ASSESSMENT:  
 
Diet: DIET DIABETIC CONSISTENT CARB Regular; AHA-LOW-CHOL FAT Po intake:  [x]  Good    []  Fair    []  Poor Weight History:  [x] No unintentional weight loss PTA    [] Had weight change:   
Nutrition Risk: None identified at this time Provided Education On: Diabetic Diet Person(s) Instructed:  [x]  Patient    [] Family    [] Other: 
Education Methods Used:  [x] Explanation    [] Handout    [] Other:  
Patients Readiness:  [] [place] Expose    [x] Acceptance    [] Non-Acceptance    [] Refused Learning Limitations:   [x] None identified    [] Identified: 
Level of Comprehension:  [x] Good    [] Needs Reinforcement Additional Concerns/Comments:  Educated patient on carbohydrate containing foods, appropriate food choices, portion control, and meal planning. Pt demonstrated an understanding of the information, however provided outpatient nutrition counseling information in case need for further education is identified after discharge. Follow-up education indicated:   [x] No: provided outpatient nutrition counseling information    [] Yes Discharge needs: [x] None identified    [] Identified and addressed Alexandria Nuñez RD Pager: [phone]

## 2019-03-31 NOTE — PROGRESS NOTES
Problem: Mobility Impaired (Adult and Pediatric) Goal: *Acute Goals and Plan of Care (Insert Text) Outcome: Resolved/Met PHYSICAL THERAPY EVALUATION AND DISCHARGE Patient: Ev Lenz (47 y.o. female) Date: 3/31/2019 Primary Diagnosis: DKA (diabetic ketoacidoses) ([facility] [E13.10] Precautions:   Fall ASSESSMENT AND RECOMMENDATIONS: 
Based on the objective data described below, the patient presents with baseline functional mobility, completing all activities independently. Pt received sitting up in bed and agreeable to PT session. Pt completing bed mobility and sit to stand independently, able to don house shoes independently while in standing. Ambulated x400 feet independently. Completed 1 flight of stairs with 1 handrail and reciprocal pattern. No reports of fatigue. Pt left sitting in bed with all needs met and call bell in reach. Skilled physical therapy is not indicated at this time. Discharge Recommendations: None Further Equipment Recommendations for Discharge: N/A   
 
SUBJECTIVE:  
Patient stated ? I am really hungry, I am ready to eat . ? OBJECTIVE DATA SUMMARY:  
 
Past Medical History:  
Diagnosis Date Depression High cholesterol Hypertension History reviewed. No pertinent surgical history. Barriers to Learning/Limitations: None Compensate with: N/A Prior Level of Function/Home Situation: Pt reports living in 2 story house with  and 2 children. She is independent in all mobility without AD. Home Situation Home Environment: Private residence # Steps to Enter: 4 One/Two Story Residence: Two story # of Interior Steps: 10 Living Alone: No 
Support Systems: Spouse/Significant Other/Partner Patient Expects to be Discharged to[de-identified] Private residence Current DME Used/Available at Home: None Tub or Shower Type: Tub/Shower combination Critical Behavior: 
Neurologic State: Alert Psychosocial 
Patient Behaviors: Calm; Cooperative Purposeful Interaction: Yes 
 Pt Identified Daily Priority: Communication issues (comment) Gregory Process: Establish trust;Teaching/learning; Attend basic human needs Caring Interventions: Reassure Reassure: Therapeutic listening Therapeutic Modalities: Humor Strength:   
Strength: Within functional limits(BLEs) Tone & Sensation:  
Tone: Normal(BLEs) Sensation: Intact(BLEs) Range Of Motion: 
AROM: Within functional limits(BLEs) Functional Mobility: 
Bed Mobility: 
Rolling: Independent Supine to Sit: Independent Sit to Supine: Independent Scooting: Independent Transfers: 
Sit to Stand: Independent Stand to Sit: Independent Balance:  
Sitting: Intact Standing: Intact Ambulation/Gait Training: 
  
 Completed x400 feet with proper gait pattern, no LOB and no need for rest break Stairs: 
 1 handrail and reciprocal pattern Therapeutic Exercises:  
 
Pain: 
Pain Scale 1: Numeric (0 - 10) Pain Intensity 1: 0 Activity Tolerance:  
Good, no complaints of fatigue or pain Please refer to the flowsheet for vital signs taken during this treatment. After treatment:  
? Patient left in no apparent distress sitting up in chair ? Patient left sitting on EOB ? Patient left in no apparent distress in bed ? Patient declined to be OOB at this time due to  
? Call bell left within reach ? Nursing notified ? Caregiver present ? Bed alarm activated COMMUNICATION/EDUCATION:  
?         Fall prevention education was provided and the patient/caregiver indicated understanding. ? Patient/family have participated as able in goal setting and plan of care. ?         Patient/family agree to work toward stated goals and plan of care. ?         Patient understands intent and goals of therapy, but is neutral about his/her participation. ? Patient is unable to participate in goal setting and plan of care. Thank you for this referral. 
Jose Zamora, PT Time Calculation: 9 mins Eval Complexity: History: MEDIUM  Complexity : 1-2 comorbidities / personal factors will impact the outcome/ POC Exam:LOW Complexity : 1-2 Standardized tests and measures addressing body structure, function, activity limitation and / or participation in recreation  Presentation: LOW Complexity : Stable, uncomplicated  Clinical Decision Making:Low Complexity low  Overall Complexity:LOW

## 2019-03-31 NOTE — PROGRESS NOTES
OCCUPATIONAL THERAPY EVALUATION/DISCHARGE Patient: Pankaj Pimentel (47 y.o. female) Date: 3/31/2019 Primary Diagnosis: DKA (diabetic k[facility]acidoses[facility] [E13.10] Precautions:   Fall ASSESSMENT AND RECOMMENDATIONS: 
Based on the objective data described below, the patient is independent with functional mobility and self care tasks without an AD. Patient has WFL AROM and strength of BUEs. Patient is able to taylor/doff socks on EOB independently. Patient was independent with simulated toilet transfer. Skilled acute care occupational therapy is not indicated at this time, will sign off. Discharge Recommendations: None Further Equipment Recommendations for Discharge: N/A Barriers to Learning/Limitations: None COMPLEXITY Eval Complexity: History: LOW Complexity : Brief history review ; Examination: LOW Complexity : 1-3 performance deficits relating to physical, cognitive , or psychosocial skils that result in activity limitations and / or participation restrictions ; Decision Making:LOW Complexity : No comorbidities that affect functional and no verbal or physical assistance needed to complete eval tasks  Assessment: Low Complexity SUBJECTIVE:  
Patient stated ? I feel fine. ? OBJECTIVE DATA SUMMARY:  
 
Past Medical History:  
Diagnosis Date Depression High cholesterol Hypertension History reviewed. No pertinent surgical history. Prior Level of Function/Home Situation: Patient reported she was independent in basic self care tasks and functional mobility PTA. Home Situation Home Environment: Private residence # Steps to Enter: 4 One/Two Story Residence: Two story # of Interior Steps: 10 Living Alone: No 
Support Systems: Spouse/Significant Other/Partner Patient Expects to be Discharged to[de-identified] Private residence Current DME Used/Available at Home: None Tub or Shower Type: Tub/Shower combination ? Right hand dominant   ? Left hand dominant Cognitive/Behavioral Status: 
Neurologic State: Alert Orientation Level: Oriented X4 Cognition: Follows commands Skin: No skin changes noted Edema: No edema noted Vision/Perceptual:   
   Acuity: Within Defined Limits Coordination: 
Coordination: Within functional limits(BUEs) Balance: 
Sitting: Intact Standing: Intact Strength: 
Strength: Within functional limits(BUEs) Tone & Sensation: 
Tone: Normal(BUEs) Sensation: Intact(BUEs) Range of Motion: 
AROM: Within functional limits(BUEs) Functional Mobility and Transfers for ADLs: 
Bed Mobility: 
Rolling: Independent Supine to Sit: Independent Sit to Supine: Independent Scooting: Independent Transfers: 
Sit to Stand: Independent Toilet Transfer : Independent(with no AD) ADL Assessment:(clinical judgement) Feeding: Independent Oral Facial Hygiene/Grooming: Independent Bathing: Independent Upper Body Dressing: Independent Lower Body Dressing: Independent Toileting: Independent Pain: 
Pt reports 0/10 pain or discomfort prior to treatment. Pt reports 0/10 pain or discomfort post treatment. Activity Tolerance:  
Good Please refer to the flowsheet for vital signs taken during this treatment. After treatment:  
?  Patient left in no apparent distress sitting up in chair ? Patient left in no apparent distress in bed 
? Call bell left within reach ? Nursing notified ? Caregiver present ? Bed alarm activated COMMUNICATION/EDUCATION:  
Communication/Collaboration: ? Home safety education was provided and the patient/caregiver indicated understanding. ? Patient/family have participated as able and agree with findings and recommendations. ?      Patient is unable to participate in plan of care at this time. RADHA Koch/KAYLEEN Time Calculation: 8 mins

## 2019-03-31 NOTE — DISCHARGE INSTRUCTIONS
Patient Education        Learning About Diabetes Food Guidelines  Your Care Instructions    Meal planning is important to manage diabetes. It helps keep your blood sugar at a target level (which you set with your doctor). You don't have to eat special foods. You can eat what your family eats, including sweets once in a while. But you do have to pay attention to how often you eat and how much you eat of certain foods. You may want to work with a dietitian or a certified diabetes educator (CDE) to help you plan meals and snacks. A dietitian or CDE can also help you lose weight if that is one of your goals. What should you know about eating carbs? Managing the amount of carbohydrate (carbs) you eat is an important part of healthy meals when you have diabetes. Carbohydrate is found in many foods. · Learn which foods have carbs. And learn the amounts of carbs in different foods. ? Bread, cereal, pasta, and rice have about 15 grams of carbs in a serving. A serving is 1 slice of bread (1 ounce), ½ cup of cooked cereal, or 1/3 cup of cooked pasta or rice. ? Fruits have 15 grams of carbs in a serving. A serving is 1 small fresh fruit, such as an apple or orange; ½ of a banana; ½ cup of cooked or canned fruit; ½ cup of fruit juice; 1 cup of melon or raspberries; or 2 tablespoons of dried fruit. ? Milk and no-sugar-added yogurt have 15 grams of carbs in a serving. A serving is 1 cup of milk or 2/3 cup of no-sugar-added yogurt. ? Starchy vegetables have 15 grams of carbs in a serving. A serving is ½ cup of mashed potatoes or sweet potato; 1 cup winter squash; ½ of a small baked potato; ½ cup of cooked beans; or ½ cup cooked corn or green peas. · Learn how much carbs to eat each day and at each meal. A dietitian or CDE can teach you how to keep track of the amount of carbs you eat. This is called carbohydrate counting. · If you are not sure how to count carbohydrate grams, use the Plate Method to plan meals.  It is a good, quick way to make sure that you have a balanced meal. It also helps you spread carbs throughout the day. ? Divide your plate by types of foods. Put non-starchy vegetables on half the plate, meat or other protein food on one-quarter of the plate, and a grain or starchy vegetable in the final quarter of the plate. To this you can add a small piece of fruit and 1 cup of milk or yogurt, depending on how many carbs you are supposed to eat at a meal.  · Try to eat about the same amount of carbs at each meal. Do not \"save up\" your daily allowance of carbs to eat at one meal.  · Proteins have very little or no carbs per serving. Examples of proteins are beef, chicken, turkey, fish, eggs, tofu, cheese, cottage cheese, and peanut butter. A serving size of meat is 3 ounces, which is about the size of a deck of cards. Examples of meat substitute serving sizes (equal to 1 ounce of meat) are 1/4 cup of cottage cheese, 1 egg, 1 tablespoon of peanut butter, and ½ cup of tofu. How can you eat out and still eat healthy? · Learn to estimate the serving sizes of foods that have carbohydrate. If you measure food at home, it will be easier to estimate the amount in a serving of restaurant food. · If the meal you order has too much carbohydrate (such as potatoes, corn, or baked beans), ask to have a low-carbohydrate food instead. Ask for a salad or green vegetables. · If you use insulin, check your blood sugar before and after eating out to help you plan how much to eat in the future. · If you eat more carbohydrate at a meal than you had planned, take a walk or do other exercise. This will help lower your blood sugar. What else should you know? · Limit saturated fat, such as the fat from meat and dairy products. This is a healthy choice because people who have diabetes are at higher risk of heart disease. So choose lean cuts of meat and nonfat or low-fat dairy products.  Use olive or canola oil instead of butter or shortening when cooking. · Don't skip meals. Your blood sugar may drop too low if you skip meals and take insulin or certain medicines for diabetes. · Check with your doctor before you drink alcohol. Alcohol can cause your blood sugar to drop too low. Alcohol can also cause a bad reaction if you take certain diabetes medicines. Follow-up care is a key part of your treatment and safety. Be sure to make and go to all appointments, and call your doctor if you are having problems. It's also a good idea to know your test results and keep a list of the medicines you take. Where can you learn more? Go to http://shobhaBrainientquintin.info/. Enter X924 in the search box to learn more about \"Learning About Diabetes Food Guidelines. \"  Current as of: July 25, 2018  Content Version: 11.9  © 9620-4771 [employer]. Care instructions adapted under license by [employer] (which disclaims liability or warranty for this information). If you have questions about a medical condition or this instruction, always ask your healthcare professional. [place] [address] any warranty or liability for your use of this information. Patient Education        Learning About Diabetes and Exercise  Can you exercise if you have diabetes? When you have diabetes, it's important to get regular exercise. This helps control your blood sugar level. You can still play sports, run, ride a bike, go swimming, and do other activities when you have diabetes. How can exercise help you manage diabetes? Your body turns the food you eat into glucose, a type of sugar. You need this sugar for energy. When you have diabetes, the sugar builds up in your blood. But when you exercise, your body uses sugar. This helps keep it from building up in your blood and results in lower blood sugar and better control of diabetes. Exercise may help you in other ways too. It can help you reach and stay at a healthy weight.  It also helps improve blood pressure and cholesterol, which can reduce the risk of heart disease. Exercise can make you feel stronger and happier. It can help you relax and sleep better, and give you confidence in other things you do. How can you exercise safely? Before you start a new exercise program, talk to your doctor about how and when to exercise. You may need to have a medical exam and tests before you begin. Some types of exercise can be harmful if your diabetes is causing other problems, such as problems with your feet. Your doctor can tell you what types of exercise are good choices for you. These tips can help you exercise safely when you have diabetes. If your diabetes is controlled by diet or medicine that doesn't lower your blood sugar, you don't need to eat a snack before you exercise. · Check your blood sugar before you exercise. And be careful about what you eat. ? If your blood sugar is less than 100, eat a carbohydrate snack before you exercise. ? Be careful when you exercise if your blood sugar is over 300. High blood sugar can make you dehydrated. And that makes your blood sugar levels go even higher. If you have ketones in your blood or urine and your blood sugar is over 300, do not exercise. · Don't try to do too much at first. Build up your exercise program bit by bit. Try to get at least 30 minutes of exercise on most days of the week. Walking is a good choice. You also may want to do other activities, such as riding a bike or swimming. You might try running or gardening. Try to include muscle-strengthening exercises at least 2 times a week. These exercises include push-ups and weight training. You can also use rubber tubing or stretch bands. You stretch or pull the tubing or band to build muscle strength. If you want to exercise more, slowly increase how hard or long you exercise. · You may get symptoms of low blood sugar during exercise or up to 24 hours later.  Some symptoms of low blood sugar, such as sweating, a fast heartbeat, or feeling tired, can be confused with what can happen anytime you exercise. Other symptoms may include feeling anxious, dizzy, weak, or shaky. So it's a good idea to check your blood sugar again. · You can treat low blood sugar by eating or drinking something that has 15 grams of carbohydrate. These should be quick-sugar foods. Zay Curia foods such as fruit juice, regular (not diet) soda, glucose tablets, hard candy, or raisins can help raise blood sugar. Check your blood sugar level again 15 minutes after having a quick-sugar food to make sure your level is getting back to your target range. · Drink plenty of water before, during, and after you exercise. · Wear medical alert jewelry that says you have diabetes. You can buy this at most drugsDevoteees. · Pay attention to your body. If you are used to exercise and notice that you can't do as much as usual, talk to your doctor. Follow-up care is a key part of your treatment and safety. Be sure to make and go to all appointments, and call your doctor if you are having problems. It's also a good idea to know your test results and keep a list of the medicines you take. Where can you learn more? Go to http://shobha-quintin.info/. Enter I763 in the search box to learn more about \"Learning About Diabetes and Exercise. \"  Current as of: July 25, 2018  Content Version: 11.9  © 6565-4767 [employer]. Care instructions adapted under license by [employer] (which disclaims liability or warranty for this information). If you have questions about a medical condition or this instruction, always ask your healthcare professional. [address] any warranty or liability for your use of this information. Patient Education     Nutrition Tips for Diabetes: After Your Visit  Your Care Instructions  A healthy diet is important to manage diabetes.  It helps you lose weight (if you need to) and keep it off. It gives you the nutrition and energy your body needs and helps prevent heart disease. But a diet for diabetes does not mean that you have to eat special foods. You can eat what your family eats, including occasional sweets and other favorites. But you do have to pay attention to how often you eat and how much you eat of certain foods. The right plan for you will give you meals that help you keep your blood sugar at healthy levels. Try to eat a variety of foods and to spread carbohydrate throughout the day. Carbohydrate raises blood sugar higher and more quickly than any other nutrient does. Carbohydrate is found in sugar, breads and cereals, fruit, starchy vegetables such as potatoes and corn, and milk and yogurt. You may want to work with a dietitian or diabetes educator to help you plan meals and snacks. A dietitian or diabetes educator also can help you lose weight if that is one of your goals. The following tips can help you enjoy your meals and stay healthy. Follow-up care is a key part of your treatment and safety. Be sure to make and go to all appointments, and call your doctor if you are having problems. Its also a good idea to know your test results and keep a list of the medicines you take. How can you care for yourself at home? · Learn which foods have carbohydrate and how much carbohydrate to eat. A dietitian or diabetes educator can help you learn to keep track of how much carbohydrate you eat. · Spread carbohydrate throughout the day. Eat some carbohydrate at all meals, but do not eat too much at any one time. · Plan meals to include food from all the food groups. These are the food groups and some example portion sizes:  ¨ Grains: 1 slice of bread (1 ounce), ½ cup of cooked cereal, and 1/3 cup of cooked pasta or rice. These have about 15 grams of carbohydrate in a serving.  Choose whole grains such as whole wheat bread or crackers, oatmeal, and brown rice more often than refined grains. ¨ Fruit: 1 small fresh fruit, such as an apple or orange; ½ of a banana; ½ cup of chopped, cooked, or canned fruit; ½ cup of fruit juice; 1 cup of melon or raspberries; and 2 tablespoons of dried fruit. These have about 15 grams of carbohydrate in a serving. ¨ Dairy: 1 cup of nonfat or low-fat milk and 2/3 cup of plain yogurt. These have about 15 grams of carbohydrate in a serving. ¨ Protein foods: Beef, chicken, turkey, fish, eggs, tofu, cheese, cottage cheese, and peanut butter. A serving size of meat is 3 ounces, which is about the size of a deck of cards. Examples of meat substitute serving sizes (equal to 1 ounce of meat) are 1/4 cup of cottage cheese, 1 egg, 1 tablespoon of peanut butter, and ½ cup of tofu. These have very little or no carbohydrate per serving. ¨ Vegetables: Starchy vegetables such as ½ cup of cooked dried beans, peas, potatoes, or corn have about 15 grams of carbohydrate. Nonstarchy vegetables have very little carbohydrate, such as 1 cup of raw leafy vegetables (such as spinach), ½ cup of other vegetables (cooked or chopped), and 3/4 cup of vegetable juice. · Use the plate format to plan meals. It is a good, quick way to make sure that you have a balanced meal. It also helps you spread carbohydrate throughout the day. You divide your plate by types of foods. Put vegetables on half the plate, meat or meat substitutes on one-quarter of the plate, and a grain or starchy vegetable (such as brown rice or a potato) in the final quarter of the plate. To this you can add a small piece of fruit and 1 cup of milk or yogurt, depending on how much carbohydrate you are supposed to eat at a meal.  · Talk to your dietitian or diabetes educator about ways to add limited amounts of sweets into your meal plan. You can eat these foods now and then, as long as you include the amount of carbohydrate they have in your daily carbohydrate allowance.   · If you drink alcohol, limit it to no more than 1 drink a day for women and 2 drinks a day for men. If you are pregnant, no amount of alcohol is known to be safe. · Protein, fat, and fiber do not raise blood sugar as much as carbohydrate does. If you eat a lot of these nutrients in a meal, your blood sugar will rise more slowly than it would otherwise. · Limit saturated fats, such as those from meat and dairy products. Try to replace it with monounsaturated fat, such as olive oil. This is a healthier choice because people who have diabetes are at higher-than-average risk of heart disease. But use a modest amount of olive oil. A tablespoon of olive oil has 14 grams of fat and 120 calories. · Exercise lowers blood sugar. If you take insulin by shots or pump, you can use less than you would if you were not exercising. Keep in mind that timing matters. If you exercise within 1 hour after a meal, your body may need less insulin for that meal than it would if you exercised 3 hours after the meal. Test your blood sugar to find out how exercise affects your need for insulin. · Exercise on most days of the week. Aim for at least 30 minutes. Exercise helps you stay at a healthy weight and helps your body use insulin. Walking is an easy way to get exercise. Gradually increase the amount you walk every day. You also may want to swim, bike, or do other activities. When you eat out  · Learn to estimate the serving sizes of foods that have carbohydrate. If you measure food at home, it will be easier to estimate the amount in a serving of restaurant food. · If the meal you order has too much carbohydrate (such as potatoes, corn, or baked beans), ask to have a low-carbohydrate food instead. Ask for a salad or green vegetables. · If you use insulin, check your blood sugar before and after eating out to help you plan how much to eat in the future. · If you eat more carbohydrate at a meal than you had planned, take a walk or do other exercise.  This will help lower your blood sugar. Where can you learn more? Go to [employer]  Enter Y954 in the search box to learn more about \"Nutrition Tips for Diabetes: After Your Visit. \"   © 1107-5388 [employer]. Care instructions adapted under license by [facility] [employer] (which disclaims liability or warranty for this information). This care instruction is for use with your licensed healthcare professional. If you have questions about a medical condition or this instruction, always ask your healthcare professional. [place] [address] any warranty or liability for your use of this information. Content Version: 39.5.409768; Current as of: June 4, 2014               DISCHARGE SUMMARY from Nurse    These are general instructions for a healthy lifestyle:    No smoking/ No tobacco products/ Avoid exposure to second hand smoke  Surgeon General's Warning:  Quitting smoking now greatly reduces serious risk to your health. Obesity, smoking, and sedentary lifestyle greatly increases your risk for illness    A healthy diet, regular physical exercise & weight monitoring are important for maintaining a healthy lifestyle    You may be retaining fluid if you have a history of heart failure or if you experience any of the following symptoms:  Weight gain of 3 pounds or more overnight or 5 pounds in a week, increased swelling in our hands or feet or shortness of breath while lying flat in bed. Please call your doctor as soon as you notice any of these symptoms; do not wait until your next office visit. Recognize signs and symptoms of STROKE:    F-face looks uneven    A-arms unable to move or move unevenly    S-speech slurred or non-existent    T-time-call 911 as soon as signs and symptoms begin-DO NOT go       Back to bed or wait to see if you get better-TIME IS BRAIN. Warning Signs of HEART ATTACK     Call 911 if you have these symptoms:   Chest discomfort.  Most heart attacks involve discomfort in the center of the chest that lasts more than a few minutes, or that goes away and comes back. It can feel like uncomfortable pressure, squeezing, fullness, or pain.  Discomfort in other areas of the upper body. Symptoms can include pain or discomfort in one or both arms, the back, neck, jaw, or stomach.  Shortness of breath with or without chest discomfort.  Other signs may include breaking out in a cold sweat, nausea, or lightheadedness. Don't wait more than five minutes to call 911 - MINUTES MATTER! Fast action can save your life. Calling 911 is almost always the fastest way to get lifesaving treatment. Emergency Medical Services staff can begin treatment when they arrive -- up to an hour sooner than if someone gets to the hospital by car.

## 2019-03-31 NOTE — DISCHARGE SUMMARY
PATIENT DISCHARGE INSTRUCTIONS      PATIENT DISCHARGE Julissa Reddy / [id number] : 1970    Admitted 3/29/2019 Discharged: 3/31/2019     Dictated # [id number]    · It is important that you take the medication exactly as they are prescribed. · Keep your medication in the bottles provided by the pharmacist and keep a list of the medication names, dosages, and times to be taken in your wallet. · Do not take other medications without consulting your doctor. What to do at Home    Recommended Diet: Cardiac Diet and Diabetic Diet    Recommended Activity: Activity as tolerated    Current Discharge Medication List      START taking these medications    Details   metFORMIN (GLUCOPHAGE) 500 mg tablet Take 1 Tab by mouth two (2) times daily (with meals). Qty: 60 Tab, Refills: 0      insulin glargine (LANTUS SOLOSTAR U-100 INSULIN) 100 unit/mL (3 mL) inpn 35 Units by SubCUTAneous route daily. Qty: 5 Pen, Refills: 0      insulin lispro (HUMALOG KWIKPEN INSULIN) 100 unit/mL kwikpen For Blood Sugar (mg/dL) of:     Less than 130 =   0 units           131 -159 =   2 units  160 -200 =   4 units  201 -240 =   6 units  241 -280 =   8 units  281 -320 =   10 units  321 -350 =   12 units  350 and above =   15 units  Qty: 1 Package, Refills: 0      Insulin Needles, Disposable, 31 gauge x 5/16\" ndle Please provide her insulin needles compatible with lantus solostar and Humalog kwikpen  Qty: 1 Package, Refills: 11      glucose blood VI test strips (CONTOUR NEXT TEST STRIPS) strip Please provide her stripts compatible with Contour Next One meter  Qty: 100 Strip, Refills: 0      alcohol swabs (ALCOHOL PADS) padm As directed  Qty: 100 Pad, Refills: 0      lancets misc As directed  Qty: 1 Package, Refills: 11         CONTINUE these medications which have NOT CHANGED    Details   B-complex with vitamin C (VITAMIN B COMPLEX-C PO) Take  by mouth. sertraline (ZOLOFT) 100 mg tablet Take 100 mg by mouth daily. lisinopril-hydroCHLOROthiazide (PRINZIDE, ZESTORETIC) 10-12.5 mg per tablet Take 1 Tab by mouth daily. simvastatin (ZOCOR) 40 mg tablet Take 40 mg by mouth nightly.                Signed By: Amilcar Castillo MD     March 31, 2019

## 2019-03-31 NOTE — PROGRESS NOTES
SUBJECTIVE: 
 
Feeling much better. No chest or abdominal pain. No SOB or cough. No nausea or vomiting. No dizziness.  is at bedside OBJECTIVE: 
 
/88   Pulse 90   Temp 99.6 °F (37.6 °C)   Resp 20   Ht 5' 4\" (1.626 m)   Wt 96.8 kg (213 lb 6.4 oz)   LMP 11/01/2018   SpO2 96%   BMI 36.63 kg/m² General appearance - alert, well appearing, and in no distress Chest - good air entry noted in bases, no wheezes Heart - S1 and S2 normal 
Abdomen - soft, nontender, nondistended, Bowel sounds present Neurological - alert, oriented, normal speech, no focal findings noted Extremities - no pedal edema noted A/P:  
 
1. New onset DM - Patient has a glucometer. Knows how to use it. Was educated by nursing. [address] [place] [address] - they have kwikpen and solostar in stock. Written scripts and given them to patient's . 2. Hyperglycemia, better. Off insulin drip. 3. Stress induced leukocytosis - resolved. 4. Hypernatremia could be from hyperglycemia induced dehydration -  resolved Possible discharge home today if she can get insulin pen and her scripts are filled. BMP:  
Lab Results Component Value Date/Time  03/31/2019 06:26 AM  
 K 4.3 03/31/2019 06:26 AM  
  (H) 03/31/2019 06:26 AM  
 CO2 29 03/31/2019 06:26 AM  
 AGAP 4 03/31/2019 06:26 AM  
  (H) 03/31/2019 06:26 AM  
 BUN 29 (H) 03/31/2019 06:26 AM  
 CREA 1.01 03/31/2019 06:26 AM  
 GFRAA >60 03/31/2019 06:26 AM  
 GFRNA 58 (L) 03/31/2019 06:26 AM  
 
CBC:  
Lab Results Component Value Date/Time WBC 10.5 03/31/2019 06:26 AM  
 HGB 14.5 03/31/2019 06:26 AM  
 HCT 44.3 03/31/2019 06:26 AM  
  03/31/2019 06:26 AM  
 
Current Discharge Medication List  
  
START taking these medications Details  
metFORMIN (GLUCOPHAGE) 500 mg tablet Take 1 Tab by mouth two (2) times daily (with meals). Qty: 60 Tab, Refills: 0 insulin glargine (LANTUS SOLOSTAR U-100 INSULIN) 100 unit/mL (3 mL) inpn 35 Units by SubCUTAneous route daily. Qty: 5 Pen, Refills: 0  
  
insulin lispro (HUMALOG KWIKPEN INSULIN) 100 unit/mL kwikpen For Blood Sugar (mg/dL) of:    
Less than 130 =   0 units 131 -159 =   2 units 160 -200 =   4 units 201 -240 =   6 units 241 -280 =   8 units 281 -320 =   10 units 321 -350 =   12 units 350 and above =   15 units Qty: 1 Package, Refills: 0 Insulin Needles, Disposable, 31 gauge x 5/16\" ndle Please provide her insulin needles compatible with lantus solostar and Humalog kwikpen 
Qty: 1 Package, Refills: 11  
  
glucose blood VI test strips (CONTOUR NEXT TEST STRIPS) strip Please provide her stripts compatible with Contour Next One meter Qty: 100 Strip, Refills: 0  
  
alcohol swabs (ALCOHOL PADS) padm As directed Qty: 100 Pad, Refills: 0  
  
lancets misc As directed Qty: 1 Package, Refills: 11 CONTINUE these medications which have NOT CHANGED Details B-complex with vitamin C (VITAMIN B COMPLEX-C PO) Take  by mouth. sertraline (ZOLOFT) 100 mg tablet Take 100 mg by mouth daily. lisinopril-hydroCHLOROthiazide (PRINZIDE, ZESTORETIC) 10-12.5 mg per tablet Take 1 Tab by mouth daily. simvastatin (ZOCOR) 40 mg tablet Take 40 mg by mouth nightly.

## 2019-03-31 NOTE — PROGRESS NOTES
Endocrinology Progress Note Patient: Lucien Reece Age: 51 y.o. : 1970 MR#: [id number] SSN: [id number] Date: 3/31/2019 Subjective:  
 
Pt seen at bedside today. Blood sugar elevated at bedtime (had pasta for dinner on meal tray). Also blood sugar elevated this morning. No acute overnight events. Assessment/Plan: This is a 52 yo AAF admitted for hyperglycemia with newly diagnosed diabetes, most likely type 2. A1c on admission was 10.7%. Given the high A1c, she will need to start basal/bolus insulin therapy to help with controlling her blood sugar. Goal is for A1c to be <7% and currently insulin is the most reliable way to accomplish this goal. She was not in DKA on admission so not likely to be Type 1, but if there are concerns, then insulin will be the appropriate therapy as well. In regards to her electrolyte abnormalities of hypernatremia, hypercalcemia, hyperphosphatemia, and hypermagnesemia, these most likely were  related to dehydration from her hyperglycemia since since they have all improved since admission. 
  
DM: newly diagnosed on this admission, m/l type 2. A1c 10.7% 
  
-extensively discussed need for rapid sugar control to help avoid long term consequences of uncontrolled DM 
  
-would increase Lantus to 30 units sq daily 
-would increase Humalog to 10 units sq tidac Medium dose correction scale qac/hs 
  
-she would benefit from starting metformin and now that SCr is back to baseline, pt can be discharged on metformin ER 500mg po. Would start with 1 tablet daily for 1 week and if tolerated, then increase to 1 tablet po bid for 1 week and if tolerated, then increase to 2 tablets po in the am and 1 tablet in the pm for 1  week and if tolerated, then can increase to 2 tablets po bid. If tolerated then this will be her maintenance dose. If pt doesn't tolerate increase in dose, then can go back to the dose she did tolerate. -will need diabetes education to know and understand how to check sugars with glucometer and to know ad understand how to draw up and administer insulin 
  
-will need  Rx for insulin as well as diabetes supplies and should have glucometer and test strip given on discharge. 
  
-will need to [facility] with Dr. Jeannine Jameson or Rich Owusu in 2-4 weeks after discharge for follow and further discussion of other treatments once A1c is at goal (<7%) 
  
Electrolyte Derangements: most likely due to impaired renal function as well as hyperglycemia. These have all improved. 
  
-would make sure pt is receiving adequate hydration since m/l is upward to 6L down due to severe hyperglycemia. Would encourage PO free water intake as well aggressive IVF to make pt euvolemic. 
  
Thank you for the consult, will continue to follow pt with you while admitted to the hospital. 
  
Time Spent: 30 minutes Case discussed with:  [x]Patient  []Family  []Nursing  []Case Management Objective:  
VS:  
Visit Vitals /81 Pulse 81 Temp 98.5 °F (36.9 °C) Resp 18 Ht 5' 4\" (1.626 m) Wt 96.8 kg (213 lb 6.4 oz) LMP 2018 SpO2 96% BMI 36.63 kg/m² Tmax/24hrs: Temp (24hrs), Av.2 °F (36.8 °C), Min:96 °F (35.6 °C), Max:99.4 °F (37.4 °C) Intake/Output Summary (Last 24 hours) at 3/31/2019 6926 Last data filed at 3/31/2019 3052 Gross per 24 hour Intake 743.91 ml Output  Net 743.91 ml General: A&Ox3 NAD HEENT: NC/AT EOMI Skin: no rashes Psych: good mood normal affect Labs:   
Recent Results (from the past 24 hour(s)) GLUCOSE, POC Collection Time: 19 10:26 AM  
Result Value Ref Range Glucose (POC) 124 (H) 70 - 110 mg/dL Dmitri Anderson Collection Time: 19 10:27 AM  
Result Value Ref Range Glucose 124 mg/dL Insulin order 6.1 units/hour Insulin adminstered 6.1 units/hour Multiplier 0.096 Low target 140 mg/dL High target 180 mg/dL D50 order 0.0 ml  
 D50 administered 0.00 ml Minutes until next BG 60 min Order initials JMT Administered initials JMT   
GLUCOSE, POC Collection Time: 03/30/19 11:33 AM  
Result Value Ref Range Glucose (POC) 71 70 - 110 mg/dL Rolinda Musca Collection Time: 03/30/19 11:34 AM  
Result Value Ref Range Glucose 71 mg/dL Insulin order 0.8 units/hour Insulin adminstered 0.8 units/hour Multiplier 0.077 Low target 140 mg/dL High target 180 mg/dL D50 order 0.0 ml  
 D50 administered 0.00 ml Minutes until next BG 60 min Order initials JMT Administered initials JMT   
GLUCOSE, POC Collection Time: 03/30/19 12:36 PM  
Result Value Ref Range Glucose (POC) 97 70 - 110 mg/dL Rolinda Musca Collection Time: 03/30/19 12:37 PM  
Result Value Ref Range Glucose 97 mg/dL Insulin order 2.3 units/hour Insulin adminstered 2.3 units/hour Multiplier 0.062 Low target 140 mg/dL High target 180 mg/dL D50 order 0.0 ml  
 D50 administered 0.00 ml Minutes until next BG 60 min Order initials JMT Administered initials JMT   
GLUCOSE, POC Collection Time: 03/30/19  1:40 PM  
Result Value Ref Range Glucose (POC) 116 (H) 70 - 110 mg/dL [facility] Collection Time: 03/30/19  1:42 PM  
Result Value Ref Range Glucose 116 mg/dL Insulin order 2.8 units/hour Insulin adminstered 2.8 units/hour Multiplier 0.050 Low target 140 mg/dL High target 180 mg/dL D50 order 0.0 ml  
 D50 administered 0.00 ml Minutes until next BG 60 min Order initials [facility] Administered initials    
TSH 3RD GENERATION Collection Time: 03/30/19  1:52 PM  
Result Value Ref Range TSH 0.83 0.36 - 3.74 uIU/mL T4, FREE Collection Time: 03/30/19  1:52 PM  
Result Value Ref Range T4, Free 0.9 0.7 - 1.5 NG/DL  
PTH INTACT Collection Time: 03/30/19  1:52 PM  
Result Value Ref Range Calcium 9.5 8.5 - 10.1 MG/DL  
 PTH, Intact 57.3 18.4 - 88.0 pg/mL PHOSPHORUS Collection Time: 03/30/19  1:52 PM  
Result Value Ref Range Phosphorus 1.6 (L) 2.5 - 4.9 MG/DL  
METABOLIC PANEL, BASIC Collection Time: 03/30/19  1:52 PM  
Result Value Ref Range Sodium 154 (H) 136 - 145 mmol/L Potassium 4.0 3.5 - 5.5 mmol/L Chloride 116 (H) 100 - 108 mmol/L  
 CO2 32 21 - 32 mmol/L Anion gap 6 3.0 - 18 mmol/L Glucose 143 (H) 74 - 99 mg/dL BUN 37 (H) 7.0 - 18 MG/DL Creatinine 1.13 0.6 - 1.3 MG/DL  
 BUN/Creatinine ratio 33 (H) 12 - 20 GFR est AA >60 >60 ml/min/1.73m2 GFR est non-AA 51 (L) >60 ml/min/1.73m2 Calcium 9.8 8.5 - 10.1 MG/DL MAGNESIUM Collection Time: 03/30/19  1:52 PM  
Result Value Ref Range Magnesium 3.3 (H) 1.6 - 2.6 mg/dL GLUCOSE, POC Collection Time: 03/30/19  4:27 PM  
Result Value Ref Range Glucose (POC) 114 (H) 70 - 110 mg/dL GLUCOSE, POC Collection Time: 03/30/19  4:53 PM  
Result Value Ref Range Glucose (POC) 107 70 - 110 mg/dL METABOLIC PANEL, BASIC Collection Time: 03/30/19  6:17 PM  
Result Value Ref Range Sodium 147 (H) 136 - 145 mmol/L Potassium 4.8 3.5 - 5.5 mmol/L Chloride 118 (H) 100 - 108 mmol/L  
 CO2 20 (L) 21 - 32 mmol/L Anion gap 9 3.0 - 18 mmol/L Glucose 129 (H) 74 - 99 mg/dL BUN 34 (H) 7.0 - 18 MG/DL Creatinine 1.09 0.6 - 1.3 MG/DL  
 BUN/Creatinine ratio 31 (H) 12 - 20 GFR est AA >60 >60 ml/min/1.73m2 GFR est non-AA 53 (L) >60 ml/min/1.73m2 Calcium 9.6 8.5 - 10.1 MG/DL MAGNESIUM Collection Time: 03/30/19  6:17 PM  
Result Value Ref Range Magnesium 3.1 (H) 1.6 - 2.6 mg/dL GLUCOSE, POC Collection Time: 03/30/19  9:33 PM  
Result Value Ref Range Glucose (POC) 174 (H) 70 - 110 mg/dL CBC WITH AUTOMATED DIFF Collection Time: 03/31/19  6:26 AM  
Result Value Ref Range WBC 10.5 4.6 - 13.2 K/uL  
 RBC 5.32 (H) 4.20 - 5.30 M/uL  
 HGB 14.5 12.0 - 16.0 g/dL HCT 44.3 35.0 - 45.0 % MCV 83.3 74.0 - 97.0 FL  
 MCH 27.3 24.0 - 34.0 PG  
 MCHC 32.7 31.0 - 37.0 g/dL  
 RDW 12.3 11.6 - 14.5 % PLATELET 927 694 - 045 K/uL MPV 11.9 (H) 9.2 - 11.8 FL  
 NEUTROPHILS 72 40 - 73 % LYMPHOCYTES 22 21 - 52 % MONOCYTES 6 3 - 10 % EOSINOPHILS 0 0 - 5 % BASOPHILS 0 0 - 2 %  
 ABS. NEUTROPHILS 7.5 1.8 - 8.0 K/UL  
 ABS. LYMPHOCYTES 2.3 0.9 - 3.6 K/UL  
 ABS. MONOCYTES 0.6 0.05 - 1.2 K/UL  
 ABS. EOSINOPHILS 0.0 0.0 - 0.4 K/UL  
 ABS. BASOPHILS 0.0 0.0 - 0.1 K/UL  
 DF AUTOMATED METABOLIC PANEL, BASIC Collection Time: 03/31/19  6:26 AM  
Result Value Ref Range Sodium 143 136 - 145 mmol/L Potassium 4.3 3.5 - 5.5 mmol/L Chloride 110 (H) 100 - 108 mmol/L  
 CO2 29 21 - 32 mmol/L Anion gap 4 3.0 - 18 mmol/L Glucose 323 (H) 74 - 99 mg/dL BUN 29 (H) 7.0 - 18 MG/DL Creatinine 1.01 0.6 - 1.3 MG/DL  
 BUN/Creatinine ratio 29 (H) 12 - 20 GFR est AA >60 >60 ml/min/1.73m2 GFR est non-AA 58 (L) >60 ml/min/1.73m2 Calcium 9.5 8.5 - 10.1 MG/DL  
GLUCOSE, POC Collection Time: 03/31/19  7:28 AM  
Result Value Ref Range Glucose (POC) 341 (H) 70 - 110 mg/dL Signed By: Lalita Bhakta MD   
 March 31, 2019 9:56 AM

## 2019-04-01 NOTE — DISCHARGE SUMMARY
[address] [place] [address]    Name:  Cary Amaya  MR#:   [id number]  :  1970  ACCOUNT #:  [de-identified]  ADMIT DATE:  2019  DISCHARGE DATE:  2019    DISCHARGED TO:  Home. DISCHARGE CONDITION:  Stable. DISCHARGE DIAGNOSES:  1.  New-onset diabetes mellitus, now better. 2.  Hyperglycemia and uncontrolled diabetes off insulin-drip. 3.  Stress-induced leukocytosis, resolved. 4.  Hyponatremia from hyperglycemia-induced dehydration, now resolved. 5.  Hypercalcemia, resolved. DISCHARGE MEDICATIONS:  1. Lantus 35 units daily. 2.  Insulin sliding scale. 3.  Metformin 500 mg twice a day. 4.  Multivitamin one tablet daily. 5.  Zoloft 100 mg p.o. daily. 6.  Lisinopril hydrochlorothiazide one tablet daily. 7.  Simvastatin 40 mg daily. IMAGING AND PROCEDURES:  Chest x-ray was done, reported negative for acute finding. CONSULTANT:  Endocrinology with Dr. Nuria Barraza. HOSPITAL COURSE:  The patient was admitted to the hospital on 2019 with complaint of elevated blood sugar. Please refer to hospital admission H and P for further details. The patient was found to have blood sugar of more than 700 and bicarb of 24 without anion gap. It looks like the patient had new-onset diabetes and she had symptoms at least a week prior to coming to hospital.  The patient was started on insulin drip. She also had hyponatremia and hypercalcemia which were thought to be secondary to dehydration caused by hyperglycemia-induced diuresis. She was given IV fluid and also advised to drink more fluid with improvement in her sodium and calcium. The patient was continued on home medications for the comorbidities. She was started on Lantus and sliding scale as well as 10 units of Humalog prior to the meals. She was seen by endocrinologist.  The patient was given education how to check the sugar and she was able to do it.   She was also given education how to use the pen and she was able to do it. She was also given education about signs and symptoms of hypoglycemia and treatment of hypoglycemia. She verbalized understanding about it. She was feeling much better, wanted to go home. She will be discharged home with the above-mentioned medications. DISCHARGE INSTRUCTION:  1. Diet:  ADA diet, cardiac diet. Activity:  As tolerated. 2.  Follow with primary care physicians in 5 days. 3.  Follow with Lexi Mcrae or Dr. Shree Hernandez office in 10 days as a new diabetic patient. Total time greater than 35 minutes.       Bridget Anderson MD      /[id number]  D:  03/31/2019 17:30  T:  03/31/2019 19:28  JOB #:  [id number]  CC:  MD Vadim Odonnell MD

## 2019-05-02 ENCOUNTER — OFFICE VISIT (OUTPATIENT)
Dept: INTERNAL MEDICINE CLINIC | Age: 49
End: 2019-05-02

## 2019-05-02 VITALS
OXYGEN SATURATION: 98 % | DIASTOLIC BLOOD PRESSURE: 72 MMHG | RESPIRATION RATE: 18 BRPM | WEIGHT: 225 LBS | TEMPERATURE: 98.9 F | HEIGHT: 65 IN | BODY MASS INDEX: 37.49 KG/M2 | SYSTOLIC BLOOD PRESSURE: 114 MMHG | HEART RATE: 86 BPM

## 2019-05-02 DIAGNOSIS — I10 ESSENTIAL HYPERTENSION: ICD-10-CM

## 2019-05-02 DIAGNOSIS — E11.9 TYPE 2 DIABETES MELLITUS WITHOUT COMPLICATION, WITH LONG-TERM CURRENT USE OF INSULIN (HCC): Primary | ICD-10-CM

## 2019-05-02 DIAGNOSIS — E78.2 MIXED HYPERLIPIDEMIA: ICD-10-CM

## 2019-05-02 DIAGNOSIS — Z79.4 TYPE 2 DIABETES MELLITUS WITHOUT COMPLICATION, WITH LONG-TERM CURRENT USE OF INSULIN (HCC): Primary | ICD-10-CM

## 2019-05-02 PROBLEM — E66.01 SEVERE OBESITY (HCC): Status: ACTIVE | Noted: 2019-05-02

## 2019-05-02 RX ORDER — ATORVASTATIN CALCIUM 40 MG/1
40 TABLET, FILM COATED ORAL DAILY
Qty: 90 TAB | Refills: 3 | Status: SHIPPED | OUTPATIENT
Start: 2019-05-02

## 2019-05-02 RX ORDER — PEN NEEDLE, DIABETIC 30 GX3/16"
NEEDLE, DISPOSABLE MISCELLANEOUS
Qty: 1 PACKAGE | Refills: 11 | Status: CANCELLED | OUTPATIENT
Start: 2019-05-02

## 2019-05-02 RX ORDER — METFORMIN HYDROCHLORIDE 500 MG/1
500 TABLET ORAL 2 TIMES DAILY WITH MEALS
Qty: 180 TAB | Refills: 3 | Status: SHIPPED | OUTPATIENT
Start: 2019-05-02

## 2019-05-02 RX ORDER — SERTRALINE HYDROCHLORIDE 100 MG/1
150 TABLET, FILM COATED ORAL DAILY
Qty: 135 TAB | Refills: 3 | Status: SHIPPED | OUTPATIENT
Start: 2019-05-02

## 2019-05-02 RX ORDER — SIMVASTATIN 40 MG/1
40 TABLET, FILM COATED ORAL
Status: CANCELLED | OUTPATIENT
Start: 2019-05-02

## 2019-05-02 RX ORDER — INSULIN GLARGINE 100 [IU]/ML
35 INJECTION, SOLUTION SUBCUTANEOUS DAILY
Qty: 5 PEN | Refills: 3 | Status: SHIPPED | OUTPATIENT
Start: 2019-05-02

## 2019-05-02 RX ORDER — INSULIN LISPRO 100 [IU]/ML
INJECTION, SOLUTION INTRAVENOUS; SUBCUTANEOUS
Qty: 1 PACKAGE | Refills: 0 | Status: CANCELLED | OUTPATIENT
Start: 2019-05-02

## 2019-05-02 RX ORDER — LISINOPRIL AND HYDROCHLOROTHIAZIDE 10; 12.5 MG/1; MG/1
1 TABLET ORAL DAILY
Qty: 90 TAB | Refills: 3 | Status: SHIPPED | OUTPATIENT
Start: 2019-05-02 | End: 2019-08-02 | Stop reason: SDUPTHER

## 2019-05-02 NOTE — PATIENT INSTRUCTIONS

## 2019-05-02 NOTE — PROGRESS NOTES
HISTORY OF PRESENT ILLNESS Jennifer Brothres is a 51 y.o. female. Here to establish care. Recently dx with DM. Had been feeling poorly. Seen in urgent care with glc > 900. Started on lantus Currently taking 35 units daily. Has not needed short acting insulin SS. Glc has been doing well on home monitoring. She has h/o HTN and was already on lisinopril-HCTZ 
H/o HLD. Has been taking Zocor which she tolerates. Works as  at [employer] Review of Systems Constitutional: Negative for chills, fever and weight loss. HENT: Negative for congestion and ear pain. Eyes: Negative for blurred vision and pain. Respiratory: Negative for cough and shortness of breath. Cardiovascular: Negative for chest pain, palpitations and leg swelling. Gastrointestinal: Negative for nausea and vomiting. Genitourinary: Negative for frequency and urgency. Musculoskeletal: Negative for joint pain and myalgias. Skin: Negative for itching and rash. Neurological: Negative for dizziness, tingling and headaches. Psychiatric/Behavioral: Negative for depression. The patient is not nervous/anxious. Past Medical History:  
Diagnosis Date  Depression  High cholesterol  Hypertension Past Surgical History:  
Procedure Laterality Date  HX  SECTION Current Outpatient Medications on File Prior to Visit Medication Sig Dispense Refill  insulin lispro (HUMALOG KWIKPEN INSULIN) 100 unit/mL kwikpen For Blood Sugar (mg/dL) of:    
Less than 130 =   0 units 131 -159 =   2 units 160 -200 =   4 units 201 -240 =   6 units 241 -280 =   8 units 281 -320 =   10 units 321 -350 =   12 units 350 and above =   15 units 1 Package 0  
 lancets misc As directed 1 Package 11  Insulin Needles, Disposable, 31 gauge x \" ndle Please provide her insulin needles compatible with lantus solostar and Humalog kwikpen 1 Package 11  
  alcohol swabs (ALCOHOL PADS) padm As directed 100 Pad 0  
 B-complex with vitamin C (VITAMIN B COMPLEX-C PO) Take  by mouth. No current facility-administered medications on file prior to visit. No Known Allergies Family History Problem Relation Age of Onset  High Cholesterol Mother  Diabetes Father  Hypertension Father  High Cholesterol Father  Breast Cancer Daughter Social History Tobacco Use  Smoking status: Never Smoker  Smokeless tobacco: Never Used Substance Use Topics  Alcohol use: Not Currently  Drug use: Not Currently Physical Exam  
Constitutional: She appears well-developed and well-nourished. No distress. /72 (BP 1 Location: Right arm, BP Patient Position: Sitting)   Pulse 86   Temp 98.9 °F (37.2 °C) (Oral)   Resp 18   Ht 5' 4.5\" (1.638 m)   Wt 225 lb (102.1 kg)   LMP 11/02/2018   SpO2 98%   BMI 38.02 kg/m² Eyes: EOM are normal. Right eye exhibits no discharge. Left eye exhibits no discharge. No scleral icterus. Neck: Neck supple. Cardiovascular: Normal rate, regular rhythm and normal heart sounds. Exam reveals no gallop and no friction rub. No murmur heard. Pulmonary/Chest: Effort normal and breath sounds normal. No respiratory distress. She has no wheezes. She has no rales. Abdominal: Soft. She exhibits no distension. There is no tenderness. There is no rebound and no guarding. Musculoskeletal: She exhibits no edema or tenderness. Lymphadenopathy:  
  She has no cervical adenopathy. Neurological: She is alert. She exhibits normal muscle tone. Skin: Skin is warm and dry. Psychiatric: She has a normal mood and affect. Lab Results Component Value Date/Time Hemoglobin A1c 10.7 (H) 03/29/2019 11:44 AM  
 
Lab Results Component Value Date/Time  Sodium 143 03/31/2019 06:26 AM  
 Potassium 4.3 03/31/2019 06:26 AM  
 Chloride 110 (H) 03/31/2019 06:26 AM  
 CO2 29 03/31/2019 06:26 AM  
 Anion gap 4 03/31/2019 06:26 AM  
 Glucose 323 (H) 03/31/2019 06:26 AM  
 BUN 29 (H) 03/31/2019 06:26 AM  
 Creatinine 1.01 03/31/2019 06:26 AM  
 BUN/Creatinine ratio 29 (H) 03/31/2019 06:26 AM  
 GFR est AA >60 03/31/2019 06:26 AM  
 GFR est non-AA 58 (L) 03/31/2019 06:26 AM  
 Calcium 9.5 03/31/2019 06:26 AM  
 Bilirubin, total 0.4 03/29/2019 11:44 AM  
 AST (SGOT) 9 (L) 03/29/2019 11:44 AM  
 Alk. phosphatase 156 (H) 03/29/2019 11:44 AM  
 Protein, total 9.6 (H) 03/29/2019 11:44 AM  
 Albumin 4.8 03/29/2019 11:44 AM  
 Globulin 4.8 (H) 03/29/2019 11:44 AM  
 A-G Ratio 1.0 03/29/2019 11:44 AM  
 ALT (SGPT) 32 03/29/2019 11:44 AM  
 
ASSESSMENT and PLAN 
  ICD-10-CM ICD-9-CM 1. Type 2 diabetes mellitus without complication, with long-term current use of insulin (HCC) E11.9 250.00   
 Z79.4 V58.67   
2. Essential hypertension I10 401.9 3. Mixed hyperlipidemia E78.2 272.2 4. BMI 38.0-38.9,adult Z68.38 V85.38 Old records requested Will continue with lantus and metformin, can hold on short acting for now. Work on lifestyle/diet changes. 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 Continue current medication Will switch statin to Lipitor.

## 2019-05-03 NOTE — TELEPHONE ENCOUNTER
Requested Prescriptions     Pending Prescriptions Disp Refills    glucose blood VI test strips (CONTOUR NEXT TEST STRIPS) strip 100 Strip 3     Sig: Please provide her stripts compatible with Contour Next One meter

## 2019-06-19 ENCOUNTER — OFFICE VISIT (OUTPATIENT)
Dept: INTERNAL MEDICINE CLINIC | Age: 49
End: 2019-06-19

## 2019-06-19 VITALS
TEMPERATURE: 97.9 F | RESPIRATION RATE: 18 BRPM | OXYGEN SATURATION: 93 % | HEART RATE: 92 BPM | SYSTOLIC BLOOD PRESSURE: 120 MMHG | WEIGHT: 214.4 LBS | HEIGHT: 64 IN | DIASTOLIC BLOOD PRESSURE: 78 MMHG | BODY MASS INDEX: 36.6 KG/M2

## 2019-06-19 DIAGNOSIS — E11.9 TYPE 2 DIABETES MELLITUS WITHOUT COMPLICATION, WITH LONG-TERM CURRENT USE OF INSULIN (HCC): ICD-10-CM

## 2019-06-19 DIAGNOSIS — F43.21 GRIEF: Primary | ICD-10-CM

## 2019-06-19 DIAGNOSIS — I10 ESSENTIAL HYPERTENSION: ICD-10-CM

## 2019-06-19 DIAGNOSIS — Z79.4 TYPE 2 DIABETES MELLITUS WITHOUT COMPLICATION, WITH LONG-TERM CURRENT USE OF INSULIN (HCC): ICD-10-CM

## 2019-06-19 LAB — HBA1C MFR BLD HPLC: 5.2 %

## 2019-06-19 RX ORDER — LORAZEPAM 0.5 MG/1
0.5 TABLET ORAL
Qty: 30 TAB | Refills: 0 | Status: SHIPPED | OUTPATIENT
Start: 2019-06-19 | End: 2019-08-02 | Stop reason: SDUPTHER

## 2019-06-19 NOTE — PROGRESS NOTES
Rm: 16    Chief Complaint   Patient presents with    Anxiety     Depression Screening:  3 most recent PHQ Screens 6/19/2019 5/2/2019 5/2/2019   Little interest or pleasure in doing things Not at all Not at all Not at all   Feeling down, depressed, irritable, or hopeless Not at all Not at all Not at all   Total Score PHQ 2 0 0 0       Learning Assessment:  Learning Assessment 5/2/2019   PRIMARY LEARNER Patient   HIGHEST LEVEL OF EDUCATION - PRIMARY LEARNER  SOME COLLEGE   PRIMARY LANGUAGE ENGLISH   LEARNER PREFERENCE PRIMARY READING   ANSWERED BY patient   RELATIONSHIP SELF       Abuse Screening:  No flowsheet data found. Health Maintenance reviewed and discussed per provider: yes     Coordination of Care:    1. Have you been to the ER, urgent care clinic since your last visit? Hospitalized since your last visit? no    2. Have you seen or consulted any other health care providers outside of the [address] [place] [address] since your last visit? Include any pap smears or colon screening.  no

## 2019-06-19 NOTE — PROGRESS NOTES
HISTORY OF PRESENT ILLNESS  Federico Jacobs is a 51 y.o. female. Here for f/u of DM, increased anxiety sx. Her daughter  last night at age 29 from metastatic breast cancer. Had been on hospice for 3 days. She is currently taking Zoloft. DM has been doing much better on home monitoring with glc ranging 100-115. BP elevated on arrival but she had been crying. No CP, SOB    Anxiety   Pertinent negatives include no chest pain, no headaches and no shortness of breath. Review of Systems   Constitutional: Negative for chills, fever and weight loss. HENT: Negative for congestion and ear pain. Eyes: Negative for blurred vision and pain. Respiratory: Negative for cough and shortness of breath. Cardiovascular: Negative for chest pain, palpitations and leg swelling. Gastrointestinal: Negative for nausea and vomiting. Genitourinary: Negative for frequency and urgency. Musculoskeletal: Negative for joint pain and myalgias. Skin: Negative for itching and rash. Neurological: Negative for dizziness, tingling and headaches. Psychiatric/Behavioral: Positive for depression. The patient is nervous/anxious. Past Medical History:   Diagnosis Date    Depression     Diabetes ([facility] [address]     High cholesterol     Hypertension      Current Outpatient Medications on File Prior to Visit   Medication Sig Dispense Refill    glucose blood VI test strips (CONTOUR NEXT TEST STRIPS) strip Please provide her stripts compatible with Contour Next One meter 100 Strip 3    metFORMIN (GLUCOPHAGE) 500 mg tablet Take 1 Tab by mouth two (2) times daily (with meals). 180 Tab 3    lisinopril-hydroCHLOROthiazide (PRINZIDE, ZESTORETIC) 10-12.5 mg per tablet Take 1 Tab by mouth daily. 90 Tab 3    insulin glargine (LANTUS SOLOSTAR U-100 INSULIN) 100 unit/mL (3 mL) inpn 35 Units by SubCUTAneous route daily. 5 Pen 3    sertraline (ZOLOFT) 100 mg tablet Take 1.5 Tabs by mouth daily.  135 Tab 3    atorvastatin (LIPITOR) 40 mg tablet Take 1 Tab by mouth daily. 90 Tab 3    insulin lispro (HUMALOG KWIKPEN INSULIN) 100 unit/mL kwikpen For Blood Sugar (mg/dL) of:     Less than 130 =   0 units           131 -159 =   2 units  160 -200 =   4 units  201 -240 =   6 units  241 -280 =   8 units  281 -320 =   10 units  321 -350 =   12 units  350 and above =   15 units 1 Package 0    Insulin Needles, Disposable, 31 gauge x 5/16\" ndle Please provide her insulin needles compatible with lantus solostar and Humalog kwikpen 1 Package 11    alcohol swabs (ALCOHOL PADS) padm As directed 100 Pad 0    lancets misc As directed 1 Package 11    B-complex with vitamin C (VITAMIN B COMPLEX-C PO) Take  by mouth. No current facility-administered medications on file prior to visit. Physical Exam   Constitutional: She appears well-developed and well-nourished. No distress. /78 (BP 1 Location: Right arm, BP Patient Position: Sitting)   Pulse 92   Temp 97.9 °F (36.6 °C) (Oral)   Resp 18   Ht 5' 4\" (1.626 m)   Wt 214 lb 6.4 oz (97.3 kg)   SpO2 93%   BMI 36.80 kg/m²      Eyes: EOM are normal. Right eye exhibits no discharge. Left eye exhibits no discharge. No scleral icterus. Cardiovascular: Normal rate, regular rhythm and normal heart sounds. Exam reveals no gallop and no friction rub. No murmur heard. Pulmonary/Chest: Effort normal and breath sounds normal. No respiratory distress. She has no wheezes. She has no rales. Neurological: She is alert. She exhibits normal muscle tone. Skin: Skin is warm and dry. Psychiatric: She has a normal mood and affect.      Lab Results   Component Value Date/Time    Sodium 143 03/31/2019 06:26 AM    Potassium 4.3 03/31/2019 06:26 AM    Chloride 110 (H) 03/31/2019 06:26 AM    CO2 29 03/31/2019 06:26 AM    Anion gap 4 03/31/2019 06:26 AM    Glucose 323 (H) 03/31/2019 06:26 AM    BUN 29 (H) 03/31/2019 06:26 AM    Creatinine 1.01 03/31/2019 06:26 AM    BUN/Creatinine ratio 29 (H) 03/31/2019 06:26 AM GFR est AA >60 03/31/2019 06:26 AM    GFR est non-AA 58 (L) 03/31/2019 06:26 AM    Calcium 9.5 03/31/2019 06:26 AM    Bilirubin, total 0.4 03/29/2019 11:44 AM    AST (SGOT) 9 (L) 03/29/2019 11:44 AM    Alk. phosphatase 156 (H) 03/29/2019 11:44 AM    Protein, total 9.6 (H) 03/29/2019 11:44 AM    Albumin 4.8 03/29/2019 11:44 AM    Globulin 4.8 (H) 03/29/2019 11:44 AM    A-G Ratio 1.0 03/29/2019 11:44 AM    ALT (SGPT) 32 03/29/2019 11:44 AM     Lab Results   Component Value Date/Time    Hemoglobin A1c 10.7 (H) 03/29/2019 11:44 AM     ASSESSMENT and PLAN    ICD-10-CM ICD-9-CM    1. Grief F43.21 309.0 LORazepam (ATIVAN) 0.5 mg tablet   2. Type 2 diabetes mellitus without complication, with long-term current use of insulin (HCC) E11.9 250.00 AMB POC HEMOGLOBIN A1C    Z79.4 V58.67    3. Essential hypertension I10 401.9      Continue with Zoloft. Will try prn lorazepam for short course  Repeat A1c today significantly improved to 5.2%! Has not been needing premeal, will taper insulin if low glc readings   BP improved manually. Continue current medication.

## 2019-06-19 NOTE — PATIENT INSTRUCTIONS
Grief (Actual/Anticipated): Care Instructions Your Care Instructions Grief is your emotional reaction to a major loss. The words \"sorrow\" and \"heartache\" often are used to describe feelings of grief. You feel grief when you lose a beloved person, pet, place, or thing. It is also natural to feel grief when you lose a valued way of life, such as a job, marriage, or good health. You may begin to grieve before a loss occurs. You may grieve for a loved one who is sick and dying. Children and adults often feel the pain of loss before a big move or divorce. This type of grief helps you get ready for a loss. Grief is different for each person. There is no \"normal\" or \"expected\" period of time for grieving. Some people adjust to their loss within a couple of months. Others may take 2 years or longer, especially if their lives were changed a lot or if the loss was sudden and shocking. Grieving can cause problems such as headaches, loss of appetite, and trouble with thinking or sleeping. You may withdraw from friends and family and behave in ways that are unusual for you. Grief may cause you to question your beliefs and views about life. Grief is natural and does not require medical treatment. But if you have trouble sleeping, it may help to take sleeping pills for a short time. It may help to talk with people who have been through or are going through similar losses. You may also want to talk to a counselor about your feelings. Talking about your loss, sharing your cares and concerns, and getting support from others are important parts of healthy grieving. Follow-up care is a key part of your treatment and safety. Be sure to make and go to all appointments, and call your doctor if you are having problems. It's also a good idea to know your test results and keep a list of the medicines you take. How can you care for yourself at home? · Get enough sleep.  Your mind helps make sense of your life while you sleep. Missing sleep can lead to illness and make it harder for you to deal with your grief. · Eat healthy foods. Try to avoid eating only foods that give you comfort. Ask someone to join you for a meal if you do not like eating alone. Consider taking a multivitamin every day. · Get some exercise every day. Even a walk can help you deal with your grief. Other exercises, such as yoga, can also help you manage stress. · Comfort yourself. Take time to look at photos or use special items that make you feel better. · Stay involved in your life. Do not withdraw from the activities you enjoy. People you know at work, Yaz[employer], clubs, or other groups can help you get through your period of grief. · Think about joining a support group to help you deal with your grief. There are many support groups to help people recover from grief. When should you call for help? Call 911 anytime you think you may need emergency care. For example, call if: 
  · You feel you cannot stop from hurting yourself or someone else.  
 Watch closely for changes in your health, and be sure to contact your doctor if: 
  · You think you may be depressed.  
  · You do not get better as expected. Where can you learn more? Go to http://shobha-quintin.info/. Enter H249 in the search box to learn more about \"Grief (Actual/Anticipated): Care Instructions. \" Current as of: April 18, 2018 Content Version: 11.9 © 3652-9187 [employer]. Care instructions adapted under license by [employer] (which disclaims liability or warranty for this information). If you have questions about a medical condition or this instruction, always ask your healthcare professional. [address] any warranty or liability for your use of this information.

## 2019-08-02 ENCOUNTER — OFFICE VISIT (OUTPATIENT)
Dept: INTERNAL MEDICINE CLINIC | Age: 49
End: 2019-08-02

## 2019-08-02 VITALS
RESPIRATION RATE: 18 BRPM | SYSTOLIC BLOOD PRESSURE: 121 MMHG | BODY MASS INDEX: 37.22 KG/M2 | WEIGHT: 218 LBS | HEIGHT: 64 IN | TEMPERATURE: 98.7 F | DIASTOLIC BLOOD PRESSURE: 71 MMHG | OXYGEN SATURATION: 97 % | HEART RATE: 74 BPM

## 2019-08-02 DIAGNOSIS — Z79.4 TYPE 2 DIABETES MELLITUS WITHOUT COMPLICATION, WITH LONG-TERM CURRENT USE OF INSULIN (HCC): Primary | ICD-10-CM

## 2019-08-02 DIAGNOSIS — I10 ESSENTIAL HYPERTENSION: ICD-10-CM

## 2019-08-02 DIAGNOSIS — F43.21 GRIEF: ICD-10-CM

## 2019-08-02 DIAGNOSIS — E11.9 TYPE 2 DIABETES MELLITUS WITHOUT COMPLICATION, WITH LONG-TERM CURRENT USE OF INSULIN (HCC): Primary | ICD-10-CM

## 2019-08-02 RX ORDER — LORAZEPAM 0.5 MG/1
0.5 TABLET ORAL
Qty: 30 TAB | Refills: 0 | Status: SHIPPED | OUTPATIENT
Start: 2019-08-02

## 2019-08-02 RX ORDER — LISINOPRIL AND HYDROCHLOROTHIAZIDE 10; 12.5 MG/1; MG/1
1 TABLET ORAL DAILY
Qty: 90 TAB | Refills: 3 | Status: SHIPPED | OUTPATIENT
Start: 2019-08-02

## 2019-08-02 RX ORDER — INSULIN LISPRO 100 [IU]/ML
INJECTION, SOLUTION INTRAVENOUS; SUBCUTANEOUS
Qty: 1 PACKAGE | Refills: 0 | Status: SHIPPED | OUTPATIENT
Start: 2019-08-02

## 2019-08-02 NOTE — PATIENT INSTRUCTIONS

## 2019-08-02 NOTE — PROGRESS NOTES
HISTORY OF PRESENT ILLNESS  Shirlene Cardenas is a 51 y.o. female. Here for f/u of DM, HTN, grief. She is coping as well as can be expect with recent death of her daughter from breast CA. Has good social support. Has needed to take Ativan prn which has been helpful. Continues on her usual dose of Zoloft. DM: excellent control by A1c last month. Diet has been a little off recently but plans to work on this  BP remains well controlled. Needs medication refill. Review of Systems   Constitutional: Negative for chills, fever and weight loss. HENT: Negative for congestion and ear pain. Eyes: Negative for blurred vision and pain. Respiratory: Negative for cough and shortness of breath. Cardiovascular: Negative for chest pain, palpitations and leg swelling. Gastrointestinal: Negative for nausea and vomiting. Genitourinary: Negative for frequency and urgency. Musculoskeletal: Negative for joint pain and myalgias. Skin: Negative for itching and rash. Neurological: Negative for dizziness, tingling and headaches. Psychiatric/Behavioral: Negative for depression. The patient is not nervous/anxious. Past Medical History:   Diagnosis Date    Depression     Diabetes ([facility] [address]     High cholesterol     Hypertension      Current Outpatient Medications on File Prior to Visit   Medication Sig Dispense Refill    LORazepam (ATIVAN) 0.5 mg tablet Take 1 Tab by mouth every eight (8) hours as needed for Anxiety. Max Daily Amount: 1.5 mg. Indications: anxious 30 Tab 0    metFORMIN (GLUCOPHAGE) 500 mg tablet Take 1 Tab by mouth two (2) times daily (with meals). 180 Tab 3    lisinopril-hydroCHLOROthiazide (PRINZIDE, ZESTORETIC) 10-12.5 mg per tablet Take 1 Tab by mouth daily. 90 Tab 3    insulin glargine (LANTUS SOLOSTAR U-100 INSULIN) 100 unit/mL (3 mL) inpn 35 Units by SubCUTAneous route daily. 5 Pen 3    sertraline (ZOLOFT) 100 mg tablet Take 1.5 Tabs by mouth daily.  135 Tab 3    atorvastatin (LIPITOR) 40 mg tablet Take 1 Tab by mouth daily. 90 Tab 3    insulin lispro (HUMALOG KWIKPEN INSULIN) 100 unit/mL kwikpen For Blood Sugar (mg/dL) of:     Less than 130 =   0 units           131 -159 =   2 units  160 -200 =   4 units  201 -240 =   6 units  241 -280 =   8 units  281 -320 =   10 units  321 -350 =   12 units  350 and above =   15 units 1 Package 0    glucose blood VI test strips (CONTOUR NEXT TEST STRIPS) strip Please provide her stripts compatible with Contour Next One meter 100 Strip 3    Insulin Needles, Disposable, 31 gauge x 5/16\" ndle Please provide her insulin needles compatible with lantus solostar and Humalog kwikpen 1 Package 11    alcohol swabs (ALCOHOL PADS) padm As directed 100 Pad 0    lancets misc As directed 1 Package 11    B-complex with vitamin C (VITAMIN B COMPLEX-C PO) Take  by mouth. No current facility-administered medications on file prior to visit. Social History     Tobacco Use    Smoking status: Never Smoker    Smokeless tobacco: Never Used   Substance Use Topics    Alcohol use: Not Currently    Drug use: Not Currently     Physical Exam   Constitutional: She appears well-developed and well-nourished. No distress. /71 (BP 1 Location: Right arm, BP Patient Position: Sitting)   Pulse 74   Temp 98.7 °F (37.1 °C) (Oral)   Resp 18   Ht 5' 4\" (1.626 m)   Wt 218 lb (98.9 kg)   SpO2 97%   BMI 37.42 kg/m²      Eyes: EOM are normal. Right eye exhibits no discharge. Left eye exhibits no discharge. No scleral icterus. Neck: Neck supple. Cardiovascular: Normal rate, regular rhythm and normal heart sounds. Exam reveals no gallop and no friction rub. No murmur heard. Pulmonary/Chest: Effort normal and breath sounds normal. No respiratory distress. She has no wheezes. She has no rales. Musculoskeletal: She exhibits no edema or tenderness. Lymphadenopathy:     She has no cervical adenopathy. Neurological: She is alert. She exhibits normal muscle tone.    Skin: Skin is warm and dry. Psychiatric: She has a normal mood and affect. Lab Results   Component Value Date/Time    Hemoglobin A1c 10.7 (H) 03/29/2019 11:44 AM    Hemoglobin A1c (POC) 5.2 06/19/2019 10:59 AM     ASSESSMENT and PLAN    ICD-10-CM ICD-9-CM    1. Type 2 diabetes mellitus without complication, with long-term current use of insulin ([facility]) E11.9 250.00     Z79.4 V58.67    2. Grief F43.21 309.0 LORazepam (ATIVAN) 0.5 mg tablet   3. Essential hypertension I10 401.9      Will continue with current medication for now. Can consider tapering insulin. Discussed grief response. Provided info on AVS regarding depression self care.

## 2019-08-02 NOTE — PROGRESS NOTES
Rm: 16    Chief Complaint   Patient presents with    Anxiety     Depression Screening:  3 most recent PHQ Screens 6/19/2019 5/2/2019 5/2/2019   Little interest or pleasure in doing things Not at all Not at all Not at all   Feeling down, depressed, irritable, or hopeless Not at all Not at all Not at all   Total Score PHQ 2 0 0 0       Learning Assessment:  Learning Assessment 5/2/2019   PRIMARY LEARNER Patient   HIGHEST LEVEL OF EDUCATION - PRIMARY LEARNER  SOME COLLEGE   PRIMARY LANGUAGE ENGLISH   LEARNER PREFERENCE PRIMARY READING   ANSWERED BY patient   RELATIONSHIP SELF       Abuse Screening:  No flowsheet data found. Health Maintenance reviewed and discussed per provider: yes     Coordination of Care:    1. Have you been to the ER, urgent care clinic since your last visit? Hospitalized since your last visit? no    2. Have you seen or consulted any other health care providers outside of the [address] [place] [address] since your last visit? Include any pap smears or colon screening.  no

## 2019-08-05 ENCOUNTER — TELEPHONE (OUTPATIENT)
Dept: INTERNAL MEDICINE CLINIC | Age: 49
End: 2019-08-05

## 2019-08-05 NOTE — TELEPHONE ENCOUNTER
Pharmacy request for a new Rx with directions and frequecy.   Requested Prescriptions     Pending Prescriptions Disp Refills    insulin lispro (HUMALOG KWIKPEN INSULIN) 100 unit/mL kwikpen 1 Package 0     Sig: For Blood Sugar as directed on prior sliding scale  Indications: type 2 diabetes mellitus

## 2019-08-05 NOTE — TELEPHONE ENCOUNTER
Please return call to pharmacy need directions on Humalog Kwikpen  Please return call when available

## 2019-08-05 NOTE — TELEPHONE ENCOUNTER
For Blood Sugar (mg/dL) of:     Less than 130 =   0 units           131 -159 =   2 units   160 -200 =   4 units   201 -240 =   6 units   241 -280 =   8 units   281 -320 =   10 units   321 -350 =   12 units   350 and above =   15 units    These instructions are not able to be include on ePrescription due to length.

## 2019-08-06 ENCOUNTER — DOCUMENTATION ONLY (OUTPATIENT)
Dept: INTERNAL MEDICINE CLINIC | Age: 49
End: 2019-08-06

## 2019-08-06 RX ORDER — INSULIN LISPRO 100 [IU]/ML
INJECTION, SOLUTION INTRAVENOUS; SUBCUTANEOUS
Qty: 1 PACKAGE | Refills: 3 | Status: SHIPPED | OUTPATIENT
Start: 2019-08-06

## 2019-08-06 RX ORDER — INSULIN LISPRO 100 [IU]/ML
INJECTION, SOLUTION INTRAVENOUS; SUBCUTANEOUS
Qty: 1 PACKAGE | Refills: 0 | OUTPATIENT
Start: 2019-08-06

## 2019-08-06 NOTE — PROGRESS NOTES
I could not ePrescribe her insulin due to length of instructions pharmacy requested on rx. Please see if this can be faxed to her pharmacy/ Otherwise, she will need to pick printed prescription up.

## 2019-08-06 NOTE — TELEPHONE ENCOUNTER
Pharm contacted at home number. 2 patient identifiers confirmed. Informed pharm of below per Dr Little Rodriguez. Please advise how often is patient checking blood sugar. Pharm needs to know if the sliding scale applies to TID before meals.

## 2019-09-14 ENCOUNTER — HOSPITAL ENCOUNTER (OUTPATIENT)
Dept: MAMMOGRAPHY | Age: 49
Discharge: HOME | End: 2019-09-14
Payer: COMMERCIAL

## 2019-09-14 DIAGNOSIS — Z12.31 VISIT FOR SCREENING MAMMOGRAM: ICD-10-CM

## 2019-09-14 PROCEDURE — 77067 SCR MAMMO BI INCL CAD: CPT

## 2020-10-30 ENCOUNTER — HOSPITAL ENCOUNTER (OUTPATIENT)
Dept: MAMMOGRAPHY | Age: 50
Discharge: HOME | End: 2020-10-30
Attending: INTERNAL MEDICINE
Payer: COMMERCIAL

## 2020-10-30 DIAGNOSIS — Z12.31 VISIT FOR SCREENING MAMMOGRAM: ICD-10-CM

## 2020-10-30 PROCEDURE — 77063 BREAST TOMOSYNTHESIS BI: CPT

## 2021-10-04 ENCOUNTER — TRANSCRIBE ORDER (OUTPATIENT)
Dept: SCHEDULING | Age: 51
End: 2021-10-04

## 2021-10-04 DIAGNOSIS — Z12.31 SCREENING MAMMOGRAM, ENCOUNTER FOR: Primary | ICD-10-CM

## 2021-11-11 ENCOUNTER — HOSPITAL ENCOUNTER (OUTPATIENT)
Dept: MAMMOGRAPHY | Age: 51
Discharge: HOME | End: 2021-11-11
Attending: INTERNAL MEDICINE
Payer: COMMERCIAL

## 2021-11-11 DIAGNOSIS — Z12.31 SCREENING MAMMOGRAM, ENCOUNTER FOR: ICD-10-CM

## 2021-11-11 PROCEDURE — 77063 BREAST TOMOSYNTHESIS BI: CPT

## 2022-03-18 PROBLEM — D72.829 LEUKOCYTOSIS: Status: ACTIVE | Noted: 2019-03-29

## 2022-03-19 PROBLEM — E66.01 SEVERE OBESITY (HCC): Status: ACTIVE | Noted: 2019-05-02

## 2022-03-19 PROBLEM — E87.0 HYPERNATREMIA: Status: ACTIVE | Noted: 2019-03-29

## 2022-03-19 PROBLEM — E11.00 HYPEROSMOLAR NON-KETOTIC STATE IN PATIENT WITH TYPE 2 DIABETES MELLITUS (HCC): Status: ACTIVE | Noted: 2019-03-29

## 2022-10-19 ENCOUNTER — TRANSCRIBE ORDER (OUTPATIENT)
Dept: SCHEDULING | Age: 52
End: 2022-10-19

## 2022-10-19 DIAGNOSIS — Z12.31 VISIT FOR SCREENING MAMMOGRAM: Primary | ICD-10-CM

## 2022-11-14 ENCOUNTER — HOSPITAL ENCOUNTER (OUTPATIENT)
Dept: MAMMOGRAPHY | Age: 52
Discharge: HOME | End: 2022-11-14
Attending: INTERNAL MEDICINE
Payer: COMMERCIAL

## 2022-11-14 DIAGNOSIS — Z12.31 VISIT FOR SCREENING MAMMOGRAM: ICD-10-CM

## 2022-11-14 PROCEDURE — 77063 BREAST TOMOSYNTHESIS BI: CPT

## 2023-10-10 ENCOUNTER — TRANSCRIBE ORDERS (OUTPATIENT)
Facility: HOSPITAL | Age: 53
End: 2023-10-10

## 2023-10-10 DIAGNOSIS — Z12.31 ENCOUNTER FOR SCREENING MAMMOGRAM FOR BREAST CANCER: Primary | ICD-10-CM

## 2023-11-15 ENCOUNTER — HOSPITAL ENCOUNTER (OUTPATIENT)
Facility: HOSPITAL | Age: 53
Discharge: HOME | End: 2023-11-18
Payer: COMMERCIAL

## 2023-11-15 VITALS — WEIGHT: 220 LBS | BODY MASS INDEX: 37.56 KG/M2 | HEIGHT: 64 IN

## 2023-11-15 DIAGNOSIS — Z12.31 ENCOUNTER FOR SCREENING MAMMOGRAM FOR BREAST CANCER: ICD-10-CM

## 2023-11-15 PROCEDURE — 77063 BREAST TOMOSYNTHESIS BI: CPT

## 2024-12-31 ENCOUNTER — TRANSCRIBE ORDERS (OUTPATIENT)
Facility: HOSPITAL | Age: 54
End: 2024-12-31

## 2024-12-31 DIAGNOSIS — Z12.31 VISIT FOR SCREENING MAMMOGRAM: Primary | ICD-10-CM

## 2025-01-02 ENCOUNTER — HOSPITAL ENCOUNTER (OUTPATIENT)
Facility: HOSPITAL | Age: 55
Discharge: HOME | End: 2025-01-02
Payer: COMMERCIAL

## 2025-01-02 VITALS — HEIGHT: 65 IN | BODY MASS INDEX: 37.15 KG/M2 | WEIGHT: 223 LBS

## 2025-01-02 DIAGNOSIS — Z12.31 VISIT FOR SCREENING MAMMOGRAM: ICD-10-CM

## 2025-01-02 PROCEDURE — 77063 BREAST TOMOSYNTHESIS BI: CPT
